# Patient Record
Sex: MALE | Race: WHITE | Employment: OTHER | URBAN - METROPOLITAN AREA
[De-identification: names, ages, dates, MRNs, and addresses within clinical notes are randomized per-mention and may not be internally consistent; named-entity substitution may affect disease eponyms.]

---

## 2018-09-25 ENCOUNTER — OFFICE VISIT (OUTPATIENT)
Dept: CARDIOLOGY CLINIC | Age: 63
End: 2018-09-25
Payer: COMMERCIAL

## 2018-09-25 VITALS
DIASTOLIC BLOOD PRESSURE: 58 MMHG | HEART RATE: 60 BPM | HEIGHT: 71 IN | WEIGHT: 172 LBS | SYSTOLIC BLOOD PRESSURE: 130 MMHG | BODY MASS INDEX: 24.08 KG/M2

## 2018-09-25 DIAGNOSIS — E78.00 PURE HYPERCHOLESTEROLEMIA: ICD-10-CM

## 2018-09-25 DIAGNOSIS — I25.10 CORONARY ARTERY DISEASE INVOLVING NATIVE CORONARY ARTERY OF NATIVE HEART WITHOUT ANGINA PECTORIS: Primary | ICD-10-CM

## 2018-09-25 DIAGNOSIS — I10 ESSENTIAL HYPERTENSION: ICD-10-CM

## 2018-09-25 DIAGNOSIS — I65.23 BILATERAL CAROTID ARTERY STENOSIS: ICD-10-CM

## 2018-09-25 PROCEDURE — G8420 CALC BMI NORM PARAMETERS: HCPCS | Performed by: INTERNAL MEDICINE

## 2018-09-25 PROCEDURE — 99214 OFFICE O/P EST MOD 30 MIN: CPT | Performed by: INTERNAL MEDICINE

## 2018-09-25 PROCEDURE — G8598 ASA/ANTIPLAT THER USED: HCPCS | Performed by: INTERNAL MEDICINE

## 2018-09-25 PROCEDURE — G8427 DOCREV CUR MEDS BY ELIG CLIN: HCPCS | Performed by: INTERNAL MEDICINE

## 2018-09-25 PROCEDURE — 1036F TOBACCO NON-USER: CPT | Performed by: INTERNAL MEDICINE

## 2018-09-25 PROCEDURE — 93000 ELECTROCARDIOGRAM COMPLETE: CPT | Performed by: INTERNAL MEDICINE

## 2018-09-25 PROCEDURE — 3017F COLORECTAL CA SCREEN DOC REV: CPT | Performed by: INTERNAL MEDICINE

## 2018-09-25 NOTE — PROGRESS NOTES
History:  Social History     Social History    Marital status:      Spouse name: N/A    Number of children: N/A    Years of education: N/A     Occupational History    Not on file. Social History Main Topics    Smoking status: Former Smoker     Packs/day: 1.00     Years: 30.00     Types: Cigarettes     Quit date: 1/5/2015    Smokeless tobacco: Former User     Types: Chew      Comment: smoking 1 cigarette/week since 7/2009; used chew as a teen    Alcohol use 0.0 oz/week      Comment: 1-2 drinks/week;  drinks 1-2 cups of coffee daily    Drug use: No      Comment: smoked marijuana in his 19's; none since    Sexual activity: Yes     Partners: Female     Other Topics Concern    Not on file     Social History Narrative    No narrative on file       Allergies: Allergies   Allergen Reactions    Bee Venom      Swelling, rash    Metoprolol      nightmares       Current Medications:  Current Outpatient Prescriptions   Medication Sig Dispense Refill    atorvastatin (LIPITOR) 40 MG tablet Take 1 tablet by mouth daily 90 tablet 3    aspirin 81 MG tablet Take 81 mg by mouth daily      amLODIPine (NORVASC) 10 MG tablet Take 10 mg by mouth daily      Omega-3 Fatty Acids (FISH OIL) 1200 MG CAPS Take by mouth 2 times daily      lisinopril (PRINIVIL;ZESTRIL) 5 MG tablet Take 1 tablet by mouth daily 30 tablet 3    HYDROcodone-acetaminophen (NORCO)  MG per tablet   Take 1 tablet by mouth 5 times daily       morphine sulfate ER (MS CONTIN) 15 MG CR tablet   Take 15 mg by mouth 3 times daily       Cholecalciferol (VITAMIN D3) 1000 UNITS CAPS Take 2,000 Units by mouth daily       Multiple Vitamin (THERA) TABS   Take by mouth daily        No current facility-administered medications for this visit.           Physical Exam:  BP (!) 130/58   Pulse 60   Ht 5' 11\" (1.803 m)   Wt 172 lb (78 kg)   BMI 23.99 kg/m²   Wt Readings from Last 3 Encounters:   09/25/18 172 lb (78 kg)   10/06/17 182 lb (82.6 kg) 09/25/17 182 lb 9.6 oz (82.8 kg)     The patient is awake, alert and in no discomfort or distress. No gross musculoskeletal deformity is present. No significant skin or nail changes are present. Gross examination of head, eyes, nose and throat are negative. Jugular venous pressure is normal and bilateral carotid bruits are present. Normal respiratory effort is noted with no accessory muscle usage present. Lung fields are clear to ascultation. Cardiac examination is notable for a regular rate and rhythm with no palpable thrill. No gallop rhythm or cardiac murmur are identified. A benign abdominal examination is present with no masses or organomegaly. Intact pulses are present throughout all extremities and no peripheral edema is present. No focal neurologic deficits are present. Laboratory Tests:  Lab Results   Component Value Date    CREATININE 1.0 08/23/2012    BUN 15 08/23/2012     08/23/2012    K 4.0 08/23/2012     08/23/2012    CO2 33 (H) 08/23/2012     No results found for: BNP  Lab Results   Component Value Date    WBC 8.0 08/23/2012    RBC 3.92 08/23/2012    HGB 14.3 08/23/2012    HCT 41.6 08/23/2012    .2 08/23/2012    MCH 36.6 08/23/2012    MCHC 34.4 08/23/2012    RDW 13.6 08/23/2012     08/23/2012    MPV 6.9 08/23/2012     No results for input(s): ALKPHOS, ALT, AST, PROT, BILITOT, BILIDIR, LABALBU in the last 72 hours.   Lab Results   Component Value Date    MG 2.4 08/23/2012     Lab Results   Component Value Date    PROTIME 11.1 08/22/2012    INR 1.1 08/22/2012     Lab Results   Component Value Date    TSH 0.564 08/23/2012     No components found for: CHLPL  Lab Results   Component Value Date    TRIG 214 (H) 08/23/2012    TRIG 213 (H) 07/19/2011     Lab Results   Component Value Date    HDL 28.0 (A) 08/23/2012    HDL 26.0 (A) 07/19/2011     Lab Results   Component Value Date    LDLCALC 144 (H) 08/23/2012    LDLCALC 63 07/19/2011       Cardiac Tests:  ECG: A resting electrocardiogram demonstrates evidence of sinus rhythm with left anterior fascicular block and right bundle branch block conduction patterns      ASSESSMENT / PLAN:  On a clinical basis, the patient appears stable from a cardiovascular standpoint with no interim symptoms in the face of his coronary atherosclerosis nor focal neurologic symptomatology in the face of his moderate bilateral carotid stenosis. At present, I have not altered his existing medical regimen and have recommended continued aggressive risk factor modification of blood pressure and serum lipids and attempt to reduce risk of future atherosclerotic development. I presently plan his clinical reassessment in approximately one year and would happily reassess him in the interim should additional cardiovascular difficulties or concerns arise. The patient's current medication list, allergies, problem list and results of all previously ordered testing were reviewed at today's visit. Follow-up office visit in 1 year    Eriberto Asencio [facility], [address] [facility]    An electronic copy of this follow-up progress note was forwarded to Dr. Kassandra Wood

## 2019-02-08 ENCOUNTER — HOSPITAL ENCOUNTER (OUTPATIENT)
Age: 64
Discharge: HOME | End: 2019-02-08
Payer: COMMERCIAL

## 2019-02-08 LAB
AMPHETAMINE SCREEN, URINE: NOT DETECTED
BARBITURATE SCREEN URINE: NOT DETECTED
BENZODIAZEPINE SCREEN, URINE: NOT DETECTED
CANNABINOID SCREEN URINE: NOT DETECTED
COCAINE METABOLITE SCREEN URINE: NOT DETECTED
METHADONE SCREEN, URINE: NOT DETECTED
OPIATE SCREEN URINE: POSITIVE
PHENCYCLIDINE SCREEN URINE: NOT DETECTED
PROPOXYPHENE SCREEN: NOT DETECTED

## 2019-02-08 PROCEDURE — 80307 DRUG TEST PRSMV CHEM ANLYZR: CPT

## 2019-02-08 PROCEDURE — G0480 DRUG TEST DEF 1-7 CLASSES: HCPCS

## 2019-02-14 LAB
6AM URINE: <10 NG/ML
CODEINE, URINE: <20 NG/ML
HYDROCODONE, URINE: 3451 NG/ML
HYDROMORPHONE, URINE: 33 NG/ML
MORPHINE URINE: 329 NG/ML
NORHYDROCODONE, URINE: 2095 NG/ML
NOROXYCODONE, URINE: <20 NG/ML
NOROXYMORPHONE, URINE: <20 NG/ML
OXYCODONE, URINE CONFIRMATION: <20 NG/ML
OXYMORPHONE, URINE: <20 NG/ML

## 2019-03-05 ENCOUNTER — HOSPITAL ENCOUNTER (OUTPATIENT)
Dept: CARDIOLOGY | Age: 64
Discharge: HOME | End: 2019-03-05
Admitting: SURGERY
Payer: COMMERCIAL

## 2019-03-05 ENCOUNTER — OFFICE VISIT (OUTPATIENT)
Dept: VASCULAR SURGERY | Age: 64
End: 2019-03-05
Payer: COMMERCIAL

## 2019-03-05 DIAGNOSIS — I65.23 BILATERAL CAROTID ARTERY STENOSIS: ICD-10-CM

## 2019-03-05 DIAGNOSIS — I65.21 CAROTID STENOSIS, ASYMPTOMATIC, RIGHT: Primary | ICD-10-CM

## 2019-03-05 PROCEDURE — 3017F COLORECTAL CA SCREEN DOC REV: CPT | Performed by: SURGERY

## 2019-03-05 PROCEDURE — G8598 ASA/ANTIPLAT THER USED: HCPCS | Performed by: SURGERY

## 2019-03-05 PROCEDURE — G8420 CALC BMI NORM PARAMETERS: HCPCS | Performed by: SURGERY

## 2019-03-05 PROCEDURE — G8427 DOCREV CUR MEDS BY ELIG CLIN: HCPCS | Performed by: SURGERY

## 2019-03-05 PROCEDURE — 99214 OFFICE O/P EST MOD 30 MIN: CPT | Performed by: SURGERY

## 2019-03-05 PROCEDURE — 1036F TOBACCO NON-USER: CPT | Performed by: SURGERY

## 2019-03-05 PROCEDURE — 93880 EXTRACRANIAL BILAT STUDY: CPT

## 2019-03-05 PROCEDURE — G8484 FLU IMMUNIZE NO ADMIN: HCPCS | Performed by: SURGERY

## 2019-09-24 ENCOUNTER — TELEPHONE (OUTPATIENT)
Dept: CARDIOLOGY CLINIC | Age: 64
End: 2019-09-24

## 2019-10-10 ENCOUNTER — OFFICE VISIT (OUTPATIENT)
Dept: CARDIOLOGY CLINIC | Age: 64
End: 2019-10-10
Payer: COMMERCIAL

## 2019-10-10 VITALS
WEIGHT: 160 LBS | BODY MASS INDEX: 22.4 KG/M2 | HEIGHT: 71 IN | SYSTOLIC BLOOD PRESSURE: 132 MMHG | HEART RATE: 86 BPM | DIASTOLIC BLOOD PRESSURE: 70 MMHG

## 2019-10-10 DIAGNOSIS — I65.23 BILATERAL CAROTID ARTERY STENOSIS: ICD-10-CM

## 2019-10-10 DIAGNOSIS — E78.00 PURE HYPERCHOLESTEROLEMIA: ICD-10-CM

## 2019-10-10 DIAGNOSIS — I10 ESSENTIAL HYPERTENSION: ICD-10-CM

## 2019-10-10 DIAGNOSIS — J44.9 CHRONIC OBSTRUCTIVE PULMONARY DISEASE, UNSPECIFIED COPD TYPE (HCC): ICD-10-CM

## 2019-10-10 DIAGNOSIS — I25.10 CORONARY ARTERY DISEASE INVOLVING NATIVE CORONARY ARTERY OF NATIVE HEART WITHOUT ANGINA PECTORIS: Primary | ICD-10-CM

## 2019-10-10 PROCEDURE — G8484 FLU IMMUNIZE NO ADMIN: HCPCS | Performed by: INTERNAL MEDICINE

## 2019-10-10 PROCEDURE — 1036F TOBACCO NON-USER: CPT | Performed by: INTERNAL MEDICINE

## 2019-10-10 PROCEDURE — G8420 CALC BMI NORM PARAMETERS: HCPCS | Performed by: INTERNAL MEDICINE

## 2019-10-10 PROCEDURE — G8598 ASA/ANTIPLAT THER USED: HCPCS | Performed by: INTERNAL MEDICINE

## 2019-10-10 PROCEDURE — 93000 ELECTROCARDIOGRAM COMPLETE: CPT | Performed by: INTERNAL MEDICINE

## 2019-10-10 PROCEDURE — G8926 SPIRO NO PERF OR DOC: HCPCS | Performed by: INTERNAL MEDICINE

## 2019-10-10 PROCEDURE — 99214 OFFICE O/P EST MOD 30 MIN: CPT | Performed by: INTERNAL MEDICINE

## 2019-10-10 PROCEDURE — 3017F COLORECTAL CA SCREEN DOC REV: CPT | Performed by: INTERNAL MEDICINE

## 2019-10-10 PROCEDURE — G8427 DOCREV CUR MEDS BY ELIG CLIN: HCPCS | Performed by: INTERNAL MEDICINE

## 2019-10-10 PROCEDURE — 3023F SPIROM DOC REV: CPT | Performed by: INTERNAL MEDICINE

## 2020-10-14 ENCOUNTER — OFFICE VISIT (OUTPATIENT)
Dept: CARDIOLOGY CLINIC | Age: 65
End: 2020-10-14
Payer: COMMERCIAL

## 2020-10-14 VITALS
DIASTOLIC BLOOD PRESSURE: 88 MMHG | BODY MASS INDEX: 22.68 KG/M2 | SYSTOLIC BLOOD PRESSURE: 156 MMHG | HEIGHT: 71 IN | WEIGHT: 162 LBS | HEART RATE: 76 BPM

## 2020-10-14 PROCEDURE — 3023F SPIROM DOC REV: CPT | Performed by: INTERNAL MEDICINE

## 2020-10-14 PROCEDURE — 3017F COLORECTAL CA SCREEN DOC REV: CPT | Performed by: INTERNAL MEDICINE

## 2020-10-14 PROCEDURE — G8420 CALC BMI NORM PARAMETERS: HCPCS | Performed by: INTERNAL MEDICINE

## 2020-10-14 PROCEDURE — G8926 SPIRO NO PERF OR DOC: HCPCS | Performed by: INTERNAL MEDICINE

## 2020-10-14 PROCEDURE — 99214 OFFICE O/P EST MOD 30 MIN: CPT | Performed by: INTERNAL MEDICINE

## 2020-10-14 PROCEDURE — G8427 DOCREV CUR MEDS BY ELIG CLIN: HCPCS | Performed by: INTERNAL MEDICINE

## 2020-10-14 PROCEDURE — 93000 ELECTROCARDIOGRAM COMPLETE: CPT | Performed by: INTERNAL MEDICINE

## 2020-10-14 PROCEDURE — 4040F PNEUMOC VAC/ADMIN/RCVD: CPT | Performed by: INTERNAL MEDICINE

## 2020-10-14 PROCEDURE — 1123F ACP DISCUSS/DSCN MKR DOCD: CPT | Performed by: INTERNAL MEDICINE

## 2020-10-14 PROCEDURE — 1036F TOBACCO NON-USER: CPT | Performed by: INTERNAL MEDICINE

## 2020-10-14 PROCEDURE — G8484 FLU IMMUNIZE NO ADMIN: HCPCS | Performed by: INTERNAL MEDICINE

## 2020-10-14 RX ORDER — LISINOPRIL 5 MG/1
5 TABLET ORAL 2 TIMES DAILY
Qty: 60 TABLET | Refills: 3 | Status: SHIPPED
Start: 2020-10-14 | End: 2020-10-15 | Stop reason: SDUPTHER

## 2020-10-14 NOTE — PROGRESS NOTES
OUTPATIENT CARDIOLOGY FOLLOW-UP    Name: Jimenez Israel    Age: 72 y.o. Primary Care Physician: Marcela Sarabia MD    Date of Service: 10/14/2020    Chief Complaint: Coronary atherosclerosis, hypertension, carotid stenosis, chronic obstructive lung disease    Interim History: Since his most recent evaluation, the patient has remained stable from a cardiovascular standpoint with no interim symptoms of anginal-like chest discomfort or other ischemic equivalents, decompensated left ventricular systolic dysfunction or volume overload. He denies any palpitations or other arrhythmia related symptoms. No symptoms of a focal neurologic origin but noted in the face of his carotid stenosis. At the time of evaluation in the absence of significant changes of his weight evidence of predominantly stage I hypertension is noted with his report of elevated systolic blood pressures on routine blood pressure monitoring. Review of Systems: The remainder of a complete multisystem review including consitutional, central nervous, respiratory, circulatory, gastrointestinal, genitourinary, endocrinologic, hematologic, musculoskeletal and psychiatric are negative. Past Medical History:  Past Medical History:   Diagnosis Date    Arthritis     CAD (coronary artery disease)     Carotid stenosis     Chronic back pain     Chronic obstructive pulmonary disease (COPD) ([facility] [address] 10/10/2019    Hyperlipidemia     Hypertension     Restless legs syndrome     Right bundle branch block 6/2011    New RBBB on EKG. Past Surgical History:  Past Surgical History:   Procedure Laterality Date    CARDIAC SURGERY      COLONOSCOPY      CORONARY ARTERY [facility]PASS GRAFT  [facility]/27/2009    [facility]. CABG [facility].    DENTAL SURGERY  6/16/2011    Full dental extraction.  DIAGNOSTIC CARDIAC CATH LAB PROCEDURE  7/2009    [facility].     ECHO COMPL W DOP COLOR FLOW  8/23/2012         ELBOW SURGERY      lt    KNEE SURGERY  1970    rt    SHOULDER SURGERY bilateral    TONSILLECTOMY      VASECTOMY         Family History:  History reviewed. No pertinent family history. Social History:  Social History     Socioeconomic History    Marital status:      Spouse name: Not on file    Number of children: Not on file    Years of education: Not on file    Highest education level: Not on file   Occupational History    Not on file   Social Needs    Financial resource strain: Not on file    Food insecurity     Worry: Not on file     Inability: Not on file    Transportation needs     Medical: Not on file     Non-medical: Not on file   Tobacco Use    Smoking status: Former Smoker     Packs/day: 1.00     Years: 30.00     Pack years: 30.00     Types: Cigarettes     Last attempt to quit: 2015     Years since quittin.7    Smokeless tobacco: Former User     Types: Chew    Tobacco comment: smoking 1 cigarette/week since 2009; used chew as a teen   Substance and Sexual Activity    Alcohol use: Not Currently     Alcohol/week: 0.0 standard drinks     Comment:    drinks 1-2 cups of coffee daily    Drug use: No     Comment: smoked marijuana in his 19's; none since    Sexual activity: Yes     Partners: Female   Lifestyle    Physical activity     Days per week: Not on file     Minutes per session: Not on file    Stress: Not on file   Relationships    Social connections     Talks on phone: Not on file     Gets together: Not on file     Attends [employer] service: Not on file     Active member of club or organization: Not on file     Attends meetings of clubs or organizations: Not on file     Relationship status: Not on file    Intimate partner violence     Fear of current or ex partner: Not on file     Emotionally abused: Not on file     Physically abused: Not on file     Forced sexual activity: Not on file   Other Topics Concern    Not on file   Social History Narrative    Not on file       Allergies:   Allergies   Allergen Reactions    Bee Venom Swelling, rash    Metoprolol      nightmares       Current Medications:  Current Outpatient Medications   Medication Sig Dispense Refill    metoprolol tartrate (LOPRESSOR) 25 MG tablet Take 25 mg by mouth as needed      lisinopril (PRINIVIL;ZESTRIL) 5 MG tablet Take 1 tablet by mouth 2 times daily 60 tablet 3    atorvastatin (LIPITOR) 40 MG tablet Take 1 tablet by mouth daily 90 tablet 3    aspirin 81 MG tablet Take 81 mg by mouth daily      amLODIPine (NORVASC) 10 MG tablet Take 10 mg by mouth daily      Omega-3 Fatty Acids (FISH OIL) 1200 MG CAPS Take by mouth 2 times daily      HYDROcodone-acetaminophen (NORCO)  MG per tablet Take 1 tablet by mouth every 6 hours as needed.  morphine sulfate ER (MS CONTIN) 15 MG CR tablet   Take 15 mg by mouth 3 times daily       Multiple Vitamin (THERA) TABS   Take by mouth daily        No current facility-administered medications for this visit. Physical Exam:  BP (!) 156/88 (Site: Right Upper Arm, Position: Sitting, Cuff Size: Medium Adult)   Pulse 76   Ht 5' 11\" (1.803 m)   Wt 162 lb (73.5 kg)   BMI 22.59 kg/m²   Wt Readings from Last 3 Encounters:   10/14/20 162 lb (73.5 kg)   10/10/19 160 lb (72.6 kg)   09/25/18 172 lb (78 kg)     The patient is awake, alert and in no discomfort or distress. No gross musculoskeletal deformity is present. No significant skin or nail changes are present. Gross examination of head, eyes, nose and throat are negative. Jugular venous pressure is normal and no carotid bruits are present. Normal respiratory effort is noted with no accessory muscle usage present. Lung fields are clear to ascultation. Cardiac examination is notable for a regular rate and rhythm with no palpable thrill. No gallop rhythm or cardiac murmur are identified. A benign abdominal examination is present with no masses or organomegaly. Intact pulses are present throughout all extremities and no peripheral edema is present.  No focal neurologic deficits are present. Laboratory Tests:  Lab Results   Component Value Date    CREATININE 1.0 08/23/2012    BUN 15 08/23/2012     08/23/2012    K 4.0 08/23/2012     08/23/2012    CO2 33 (H) 08/23/2012     No results found for: BNP  Lab Results   Component Value Date    WBC 8.0 08/23/2012    RBC 3.92 08/23/2012    HGB 14.3 08/23/2012    HCT 41.6 08/23/2012    .2 08/23/2012    MCH 36.6 08/23/2012    MCHC 34.4 08/23/2012    RDW 13.6 08/23/2012     08/23/2012    MPV 6.9 08/23/2012     No results for input(s): ALKPHOS, ALT, AST, PROT, BILITOT, BILIDIR, LABALBU in the last 72 hours. Lab Results   Component Value Date    MG 2.4 08/23/2012     Lab Results   Component Value Date    PROTIME 11.1 08/22/2012    INR 1.1 08/22/2012     Lab Results   Component Value Date    TSH 0.564 08/23/2012     No components found for: CHLPL  Lab Results   Component Value Date    TRIG 214 (H) 08/23/2012    TRIG 213 (H) 07/19/2011     Lab Results   Component Value Date    HDL 28.0 (A) 08/23/2012    HDL 26.0 (A) 07/19/2011     Lab Results   Component Value Date    LDLCALC 144 (H) 08/23/2012    [address] 07/19/2011       Cardiac Tests:  ECG: A resting electrocardiogram demonstrates evidence of sinus rhythm with occasional ventricular ectopy with evidence of left anterior fascicular block and right bundle branch block conduction patterns      ASSESSMENT / PLAN: Clinical basis, the patient remains symptomatically stable from a cardiovascular standpoint in the face of his coronary atherosclerosis and hypertension with suboptimal control of his blood pressure reported both on home blood pressure monitoring time of his assessment. On this basis, I have increased his lisinopril dosage to 5 mg twice daily with recommended monitoring of his blood pressure with his routine basis prior to his reassessment with you next month and further modification of his dosing as necessary.   I have encouraged continued appropriate lifestyle monitoring including that of sodium intake to further assist blood pressure control. I would additionally recommend reassessment of his serum chemistries to assess renal function and electrolytes with the alteration of his angiotensin-converting enzyme inhibitor dosing. Continue aggressive risk factor modification of blood pressure and serum lipids will remain essential to reducing risk of future atherosclerotic development. I presently plan his clinical reassessment in approximately 1 year with consideration of a repeat functional assessment of his coronary circulation and would happily reevaluate him in the interim should additional cardiovascular difficulties or concerns arise. The patient's current medication list, allergies, problem list and results of all previously ordered testing were reviewed at today's visit. Follow-up office visit in 1 year      Note: This report was completed using computerized voice recognition software. Every effort has been made to ensure accuracy, however; and invert and computerized transcription errors may be present. Anabel Decker.  Wilson Aldrich, [facility]

## 2020-10-15 RX ORDER — LISINOPRIL 5 MG/1
5 TABLET ORAL 2 TIMES DAILY
Qty: 60 TABLET | Refills: 3 | Status: SHIPPED | OUTPATIENT
Start: 2020-10-15

## 2021-06-30 ENCOUNTER — HOSPITAL ENCOUNTER (OUTPATIENT)
Age: 66
Discharge: HOME | End: 2021-07-02

## 2021-06-30 PROCEDURE — 88305 TISSUE EXAM BY PATHOLOGIST: CPT

## 2021-11-15 ENCOUNTER — OFFICE VISIT (OUTPATIENT)
Dept: CARDIOLOGY CLINIC | Age: 66
End: 2021-11-15
Payer: COMMERCIAL

## 2021-11-15 VITALS
HEART RATE: 70 BPM | SYSTOLIC BLOOD PRESSURE: 178 MMHG | BODY MASS INDEX: 24.22 KG/M2 | WEIGHT: 173 LBS | DIASTOLIC BLOOD PRESSURE: 80 MMHG | HEIGHT: 71 IN

## 2021-11-15 DIAGNOSIS — E78.00 PURE HYPERCHOLESTEROLEMIA: ICD-10-CM

## 2021-11-15 DIAGNOSIS — I25.10 CORONARY ARTERY DISEASE INVOLVING NATIVE CORONARY ARTERY OF NATIVE HEART WITHOUT ANGINA PECTORIS: Primary | ICD-10-CM

## 2021-11-15 DIAGNOSIS — I10 PRIMARY HYPERTENSION: ICD-10-CM

## 2021-11-15 DIAGNOSIS — J44.9 CHRONIC OBSTRUCTIVE PULMONARY DISEASE, UNSPECIFIED COPD TYPE (HCC): ICD-10-CM

## 2021-11-15 DIAGNOSIS — I65.23 BILATERAL CAROTID ARTERY STENOSIS: ICD-10-CM

## 2021-11-15 PROCEDURE — G8427 DOCREV CUR MEDS BY ELIG CLIN: HCPCS | Performed by: INTERNAL MEDICINE

## 2021-11-15 PROCEDURE — 99214 OFFICE O/P EST MOD 30 MIN: CPT | Performed by: INTERNAL MEDICINE

## 2021-11-15 PROCEDURE — 4040F PNEUMOC VAC/ADMIN/RCVD: CPT | Performed by: INTERNAL MEDICINE

## 2021-11-15 PROCEDURE — G8926 SPIRO NO PERF OR DOC: HCPCS | Performed by: INTERNAL MEDICINE

## 2021-11-15 PROCEDURE — 1123F ACP DISCUSS/DSCN MKR DOCD: CPT | Performed by: INTERNAL MEDICINE

## 2021-11-15 PROCEDURE — G8484 FLU IMMUNIZE NO ADMIN: HCPCS | Performed by: INTERNAL MEDICINE

## 2021-11-15 PROCEDURE — 3023F SPIROM DOC REV: CPT | Performed by: INTERNAL MEDICINE

## 2021-11-15 PROCEDURE — G8420 CALC BMI NORM PARAMETERS: HCPCS | Performed by: INTERNAL MEDICINE

## 2021-11-15 PROCEDURE — 3017F COLORECTAL CA SCREEN DOC REV: CPT | Performed by: INTERNAL MEDICINE

## 2021-11-15 PROCEDURE — 93000 ELECTROCARDIOGRAM COMPLETE: CPT | Performed by: INTERNAL MEDICINE

## 2021-11-15 PROCEDURE — 1036F TOBACCO NON-USER: CPT | Performed by: INTERNAL MEDICINE

## 2021-11-15 NOTE — PROGRESS NOTES
OUTPATIENT CARDIOLOGY FOLLOW-UP    Name: Timothy Cervantes    Age: 77 y.o. Primary Care Physician: Tabitha Roper MD    Date of Service: 11/15/2021    Chief Complaint: Coronary atherosclerosis, chronic obstructive lung disease, hypertension, bilateral carotid stenosis    Interim History: Since his most recent evaluation, the patient remains compensated from a cardiovascular standpoint and denies interim symptoms of anginal-like chest discomfort or other ischemic equivalents no additional manifestations of decompensated left ventricular systolic dysfunction or volume overload. He has experienced no symptoms of a focal neurologic origin. At the time of interim assessment by the vascular surgical service at the [facility], repeat carotid ultrasound studies demonstrate moderate stenosis of both internal carotid distributions with suspicion of a left subclavian stenosis based on flow within his left vertebral artery in the absence of symptoms or pulse deficits. On the day of his present assessment evidence of stage II systolic hypertension is noted. Review of Systems: The remainder of a complete multisystem review including consitutional, central nervous, respiratory, circulatory, gastrointestinal, genitourinary, endocrinologic, hematologic, musculoskeletal and psychiatric are negative. Past Medical History:  Past Medical History:   Diagnosis Date    Arthritis     CAD (coronary artery disease)     Carotid stenosis     Chronic back pain     Chronic obstructive pulmonary disease (COPD) ([facility] [address] 10/10/2019    Hyperlipidemia     Hypertension     Restless legs syndrome     Right bundle branch block 6/2011    New RBBB on EKG. Past Surgical History:  Past Surgical History:   Procedure Laterality Date    CARDIAC SURGERY      COLONOSCOPY      CORONARY ARTERY BYPASS GRAFT  7/27/2009    [facility]. CABG [facility].    DENTAL SURGERY  6/16/2011    Full dental extraction.       DIAGNOSTIC CARDIAC CATH LAB PROCEDURE  2009    [facility].  ECHO COMPL W DOP COLOR FLOW  2012         ELBOW SURGERY      lt    KNEE SURGERY  1970    rt    SHOULDER SURGERY      bilateral    TONSILLECTOMY      VASECTOMY         Family History:  History reviewed. No pertinent family history. Social History:  Social History     Socioeconomic History    Marital status:      Spouse name: Not on file    Number of children: Not on file    Years of education: Not on file    Highest education level: Not on file   Occupational History    Not on file   Tobacco Use    Smoking status: Former Smoker     Packs/day: 1.00     Years: 30.00     Pack years: 30.00     Types: Cigarettes     Quit date: 2015     Years since quittin.8    Smokeless tobacco: Former User     Types: Chew    Tobacco comment: smoking 1 cigarette/week since 2009; used chew as a teen   Vaping Use    Vaping Use: Not on file   Substance and Sexual Activity    Alcohol use: Not Currently     Alcohol/week: 0.0 standard drinks     Comment:    drinks 1-2 cups of coffee daily    Drug use: No     Comment: smoked marijuana in his 19's; none since    Sexual activity: Yes     Partners: Female   Other Topics Concern    Not on file   Social History Narrative    Not on file     Social Determinants of Health     Financial Resource Strain:     Difficulty of Paying Living Expenses: Not on file   Food Insecurity:     Worried About [address] [facility] in the Last Year: Not on file    [address] in the Last Year: Not on file   Transportation Needs:     Lack of Transportation (Medical): Not on file    Lack of Transportation (Non-Medical):  Not on file   Physical Activity:     Days of Exercise per Week: Not on file    Minutes of Exercise per Session: Not on file   Stress:     Feeling of Stress : Not on file   Social Connections:     Frequency of Communication with Friends and Family: Not on file    Frequency of Social Gatherings with Friends and Family: Not on file    Attends Sabianism Services: Not on file    Active Member of Clubs or Organizations: Not on file    Attends Club or Organization Meetings: Not on file    Marital Status: Not on file   Intimate Partner Violence:     Fear of Current or Ex-Partner: Not on file    Emotionally Abused: Not on file    Physically Abused: Not on file    Sexually Abused: Not on file   Housing Stability:     Unable to Pay for Housing in the Last Year: Not on file    Number of [place] in the Last Year: Not on file    Unstable Housing in the Last Year: Not on file       Allergies: Allergies   Allergen Reactions    Bee Venom      Swelling, rash    Metoprolol      nightmares       Current Medications:  Current Outpatient Medications   Medication Sig Dispense Refill    lisinopril (PRINIVIL;ZESTRIL) 5 MG tablet Take 1 tablet by mouth 2 times daily 60 tablet 3    atorvastatin (LIPITOR) 40 MG tablet Take 1 tablet by mouth daily 90 tablet 3    aspirin 81 MG tablet Take 81 mg by mouth daily      amLODIPine (NORVASC) 10 MG tablet Take 10 mg by mouth daily      Omega-3 Fatty Acids (FISH OIL) 1200 MG CAPS Take by mouth 2 times daily      HYDROcodone-acetaminophen (NORCO)  MG per tablet Take 1 tablet by mouth every 6 hours as needed.  morphine sulfate ER (MS CONTIN) 15 MG CR tablet   Take 15 mg by mouth 3 times daily       Multiple Vitamin (THERA) TABS   Take by mouth daily        No current facility-administered medications for this visit. Physical Exam:  BP (!) 178/80 (Site: Right Upper Arm, Position: Sitting, Cuff Size: Large Adult)   Pulse 70   Ht 5' 11\" (1.803 m)   Wt 173 lb (78.5 kg)   BMI 24.13 kg/m²   Wt Readings from Last 3 Encounters:   11/15/21 173 lb (78.5 kg)   10/14/20 162 lb (73.5 kg)   10/10/19 160 lb (72.6 kg)     The patient is awake, alert and in no discomfort or distress. No gross musculoskeletal deformity is present.  No significant skin or nail changes are present. Gross examination of head, eyes, nose and throat are negative. Jugular venous pressure is normal and bilateral carotid bruits are present. Normal respiratory effort is noted with no accessory muscle usage present. Lung fields are clear to ascultation. Cardiac examination is notable for a regular rate and rhythm with no palpable thrill. No gallop rhythm or cardiac murmur are identified. A benign abdominal examination is present with no masses or organomegaly. Intact pulses are present throughout all extremities and no peripheral edema is present. No focal neurologic deficits are present. Laboratory Tests:  Lab Results   Component Value Date    CREATININE 1.0 08/23/2012    BUN 15 08/23/2012     08/23/2012    K 4.0 08/23/2012     08/23/2012    CO2 33 (H) 08/23/2012     No results found for: BNP  Lab Results   Component Value Date    WBC 8.0 08/23/2012    RBC 3.92 08/23/2012    HGB 14.3 08/23/2012    HCT 41.6 08/23/2012    .2 08/23/2012    MCH 36.6 08/23/2012    MCHC 34.4 08/23/2012    RDW 13.6 08/23/2012     08/23/2012    MPV 6.9 08/23/2012     No results for input(s): ALKPHOS, ALT, AST, PROT, BILITOT, BILIDIR, LABALBU in the last 72 hours.   Lab Results   Component Value Date    MG 2.4 08/23/2012     Lab Results   Component Value Date    PROTIME 11.1 08/22/2012    INR 1.1 08/22/2012     Lab Results   Component Value Date    TSH 0.564 08/23/2012     No components found for: CHLPL  Lab Results   Component Value Date    TRIG 214 (H) 08/23/2012    TRIG 213 (H) 07/19/2011     Lab Results   Component Value Date    HDL 28.0 (A) 08/23/2012    HDL 26.0 (A) 07/19/2011     Lab Results   Component Value Date    LDLCALC 144 (H) 08/23/2012    LDLCALC 63 07/19/2011       Cardiac Tests:  ECG: A resting electrocardiogram demonstrates evidence of sinus rhythm with left anterior fascicular block and right bundle branch block conduction patterns      ASSESSMENT / PLAN: On a clinical basis, the patient presently appears compensated from a cardiovascular standpoint with no active cardiovascular symptoms in the face of his diffuse atherosclerotic vascular disease. Presently, I have not altered his existing medical regimen and on a prognostic basis of recommend the performance of a gated vasodilator myocardial perfusion imaging study will provide additional recommendations as appropriate following its completion and review. Ongoing needs of aggressive risk factor modification of blood pressure and serum lipids will remain essential to reducing risk of future atherosclerotic development. Unless dictated by perfusion findings, I plan to continue annual cardiovascular reassessment would happily reevaluate him in the interim should additional cardiovascular difficulties or concerns arise. The patient's current medication list, allergies, problem list and results of all previously ordered testing were reviewed at today's visit. Follow-up office visit in 1 year      Note: This report was completed using computerized voice recognition software. Every effort has been made to ensure accuracy, however; inadvertent computerized transcription errors may be present. Humaira Covarrubias.  Amol Garcia, [facility] [facility]

## 2021-11-30 ENCOUNTER — TELEPHONE (OUTPATIENT)
Dept: CARDIOLOGY | Age: 66
End: 2021-11-30

## 2021-11-30 NOTE — TELEPHONE ENCOUNTER
Spoke with patient reminder of appointment on 12/1/21 at 0830 for a Pharmacological stress test instructions and COVID checklist reviewed patient confirmed appointment

## 2021-12-01 ENCOUNTER — HOSPITAL ENCOUNTER (OUTPATIENT)
Dept: CARDIOLOGY | Age: 66
Discharge: HOME | End: 2021-12-01
Payer: COMMERCIAL

## 2021-12-01 ENCOUNTER — TELEPHONE (OUTPATIENT)
Dept: CARDIOLOGY CLINIC | Age: 66
End: 2021-12-01

## 2021-12-01 VITALS
SYSTOLIC BLOOD PRESSURE: 150 MMHG | DIASTOLIC BLOOD PRESSURE: 52 MMHG | BODY MASS INDEX: 24.22 KG/M2 | HEART RATE: 56 BPM | RESPIRATION RATE: 20 BRPM | HEIGHT: 71 IN | WEIGHT: 173 LBS

## 2021-12-01 DIAGNOSIS — I25.10 CORONARY ARTERY DISEASE INVOLVING NATIVE CORONARY ARTERY OF NATIVE HEART WITHOUT ANGINA PECTORIS: ICD-10-CM

## 2021-12-01 PROCEDURE — 2580000003 HC RX 258: Performed by: INTERNAL MEDICINE

## 2021-12-01 PROCEDURE — 6360000002 HC RX W HCPCS: Performed by: INTERNAL MEDICINE

## 2021-12-01 PROCEDURE — 78452 HT MUSCLE IMAGE SPECT MULT: CPT

## 2021-12-01 PROCEDURE — 3430000000 HC RX DIAGNOSTIC RADIOPHARMACEUTICAL: Performed by: INTERNAL MEDICINE

## 2021-12-01 PROCEDURE — A9502 TC99M TETROFOSMIN: HCPCS | Performed by: INTERNAL MEDICINE

## 2021-12-01 PROCEDURE — 93017 CV STRESS TEST TRACING ONLY: CPT

## 2021-12-01 RX ORDER — SODIUM CHLORIDE 0.9 % (FLUSH) 0.9 %
10 SYRINGE (ML) INJECTION PRN
Status: DISCONTINUED | OUTPATIENT
Start: 2021-12-01 | End: 2021-12-02 | Stop reason: HOSPADM

## 2021-12-01 RX ORDER — MECLIZINE HYDROCHLORIDE 25 MG/1
25 TABLET ORAL 3 TIMES DAILY PRN
COMMUNITY

## 2021-12-01 RX ADMIN — SODIUM CHLORIDE, PRESERVATIVE FREE 10 ML: 5 INJECTION INTRAVENOUS at 08:27

## 2021-12-01 RX ADMIN — TETROFOSMIN 11 MILLICURIE: 0.23 INJECTION, POWDER, LYOPHILIZED, FOR SOLUTION INTRAVENOUS at 08:27

## 2021-12-01 RX ADMIN — SODIUM CHLORIDE, PRESERVATIVE FREE 10 ML: 5 INJECTION INTRAVENOUS at 10:05

## 2021-12-01 RX ADMIN — TETROFOSMIN 33.6 MILLICURIE: 0.23 INJECTION, POWDER, LYOPHILIZED, FOR SOLUTION INTRAVENOUS at 10:05

## 2021-12-01 RX ADMIN — SODIUM CHLORIDE, PRESERVATIVE FREE 10 ML: 5 INJECTION INTRAVENOUS at 10:06

## 2021-12-01 RX ADMIN — REGADENOSON 0.4 MG: 0.08 INJECTION, SOLUTION INTRAVENOUS at 10:05

## 2021-12-01 NOTE — PROCEDURES
[address] [facility]. Natalie Alejandra [address] [place] [address]  [phone]                 Pharmacologic Stress Nuclear Gated SPECT Study    Name: Sanchez Rosario Account Number: [de-identified]    :  1955          Sex: male         Date of Study:  2021    Height: 5' 11\" (180.3 cm)         Weight: 173 lb (78.5 kg)     Ordering Provider: Alena Swanson          PCP: Mallory Proctor MD      Cardiologist: Alena Swanson             Interpreting Physician: Alena Swanson  _________________________________________________________________________________    Indication:   Evaluation of extent and severity of coronary artery disease    Clinical History:   Patient has prior history of coronary artery disease. Resting ECG:    VT int 158m sec, QRS int 154m sec, QT int 460m sec; HR 56 bpm  Normal sinus rhythm with left anterior fascicular block and right bundle branch block conduction patterns    Procedure:   Pharmacologic stress testing was performed with regadenoson 0.4 mg for 15 seconds. The heart rate was 56 at baseline and nora to 91 beats during the infusion. The blood pressure at baseline was 150/52 and blood pressure at the end of infusion was 157/47. Blood pressure response was normal during the stress procedure. The patient tolerated the infusion well. ECG during the infusion did not change. IMAGING: Myocardial perfusion imaging was performed at rest 30-35 minutes following the intravenous injection of 11 mCi of (Tc-tetrofosmin) followed by 10 ml of Normal Saline. As per infusion protocol, the patient was injected intravenously with 33.6 mCi of (Tc-tetrofosmin) followed by 10 ml of Normal Saline. Gated post-stress tomographic imaging was performed 45 minutes after stress. FINDINGS: The overall quality of the study was good.      Left ventricular cavity size was noted to be normal.    Rotational analog analysis demonstrated abnormal extracardiac radioactivity from hepatic uptake and adjacent to the apical segment. The gated SPECT stress imaging in the short, vertical long, and horizontal long axis demonstrated a small segment of moderately diminished tracer within the periapical segment both on post regadenoson and resting images. Gated SPECT left ventricular ejection fraction was calculated to be 67%, with with abnormal septal motion consistent with that of prior cardiac surgery. Impression:    1. Electrocardiographically normal regadenoson infusion with a clinically  non-ischemic response  2. Myocardial perfusion imaging was equivocal with a small size, moderate intensity fixed periapical perfusion abnormality in the face of significant extraneous hepatic uptake. 3. Overall left ventricular systolic function was normal with abnormal septal motion consistent with that of prior cardiac surgery. .  4.   Low risk pharmacologic stress test.    Thank you for sending your patient to this [employer].      Electronically signed by Luis M Israel MD on 12/1/21 at 12:55 PM EST [Slow urine stream] : slow urine stream [Negative] : Heme/Lymph [Abdominal Pain] : abdominal pain [see HPI] : see HPI [[email or web address]] : Pain on abdomen area and the right side of the back

## 2021-12-01 NOTE — TELEPHONE ENCOUNTER
----- Message from Pierce Councilman, MD sent at 12/1/2021 12:56 PM EST -----  Please notify patient that stress test shows no abnormalities consistent with that of progression of his disease and normal heart muscle function. Continue as directed and follow-up as scheduled    LM for pt re above.

## 2022-08-05 ENCOUNTER — APPOINTMENT (OUTPATIENT)
Dept: CT IMAGING | Age: 67
DRG: 200 | End: 2022-08-05
Payer: COMMERCIAL

## 2022-08-05 ENCOUNTER — HOSPITAL ENCOUNTER (INPATIENT)
Age: 67
LOS: 2 days | Discharge: HOME | DRG: 200 | End: 2022-08-07
Attending: STUDENT IN AN ORGANIZED HEALTH CARE EDUCATION/TRAINING PROGRAM | Admitting: SURGERY
Payer: COMMERCIAL

## 2022-08-05 ENCOUNTER — APPOINTMENT (OUTPATIENT)
Dept: GENERAL RADIOLOGY | Age: 67
DRG: 200 | End: 2022-08-05
Payer: COMMERCIAL

## 2022-08-05 DIAGNOSIS — S27.0XXA TRAUMATIC PNEUMOTHORAX, INITIAL ENCOUNTER: Primary | ICD-10-CM

## 2022-08-05 DIAGNOSIS — S22.42XA CLOSED FRACTURE OF MULTIPLE RIBS OF LEFT SIDE, INITIAL ENCOUNTER: ICD-10-CM

## 2022-08-05 DIAGNOSIS — T14.90XA TRAUMA: ICD-10-CM

## 2022-08-05 LAB
ALBUMIN SERPL-MCNC: 4.3 G/DL (ref 3.5–5.2)
ALP BLD-CCNC: 65 U/L (ref 40–129)
ALT SERPL-CCNC: 11 U/L (ref 0–40)
ANION GAP SERPL CALCULATED.3IONS-SCNC: 10 MMOL/L (ref 7–16)
APTT: 31.5 SEC (ref 24.5–35.1)
AST SERPL-CCNC: 16 U/L (ref 0–39)
BASOPHILS ABSOLUTE: 0.02 E9/L (ref 0–0.2)
BASOPHILS RELATIVE PERCENT: 0.2 % (ref 0–2)
BILIRUB SERPL-MCNC: 0.3 MG/DL (ref 0–1.2)
BUN BLDV-MCNC: 11 MG/DL (ref 6–23)
CALCIUM SERPL-MCNC: 8.9 MG/DL (ref 8.6–10.2)
CHLORIDE BLD-SCNC: 104 MMOL/L (ref 98–107)
CO2: 25 MMOL/L (ref 22–29)
CREAT SERPL-MCNC: 0.8 MG/DL (ref 0.7–1.2)
EOSINOPHILS ABSOLUTE: 0.35 E9/L (ref 0.05–0.5)
EOSINOPHILS RELATIVE PERCENT: 3.2 % (ref 0–6)
GFR AFRICAN AMERICAN: >60
GFR NON-AFRICAN AMERICAN: >60 ML/MIN/1.73
GLUCOSE BLD-MCNC: 99 MG/DL (ref 74–99)
HCT VFR BLD CALC: 38.7 % (ref 37–54)
HEMOGLOBIN: 12.7 G/DL (ref 12.5–16.5)
IMMATURE GRANULOCYTES #: 0.03 E9/L
IMMATURE GRANULOCYTES %: 0.3 % (ref 0–5)
INR BLD: 1
LACTIC ACID: 0.6 MMOL/L (ref 0.5–2.2)
LYMPHOCYTES ABSOLUTE: 2.67 E9/L (ref 1.5–4)
LYMPHOCYTES RELATIVE PERCENT: 24.2 % (ref 20–42)
MCH RBC QN AUTO: 31.4 PG (ref 26–35)
MCHC RBC AUTO-ENTMCNC: 32.8 % (ref 32–34.5)
MCV RBC AUTO: 95.8 FL (ref 80–99.9)
MONOCYTES ABSOLUTE: 2.05 E9/L (ref 0.1–0.95)
MONOCYTES RELATIVE PERCENT: 18.6 % (ref 2–12)
NEUTROPHILS ABSOLUTE: 5.91 E9/L (ref 1.8–7.3)
NEUTROPHILS RELATIVE PERCENT: 53.5 % (ref 43–80)
PDW BLD-RTO: 12.8 FL (ref 11.5–15)
PLATELET # BLD: 135 E9/L (ref 130–450)
PMV BLD AUTO: 9.6 FL (ref 7–12)
POTASSIUM REFLEX MAGNESIUM: 4.3 MMOL/L (ref 3.5–5)
PROTHROMBIN TIME: 11 SEC (ref 9.3–12.4)
RBC # BLD: 4.04 E12/L (ref 3.8–5.8)
RBC # BLD: NORMAL 10*6/UL
SODIUM BLD-SCNC: 139 MMOL/L (ref 132–146)
TOTAL PROTEIN: 7.3 G/DL (ref 6.4–8.3)
WBC # BLD: 11 E9/L (ref 4.5–11.5)

## 2022-08-05 PROCEDURE — 71250 CT THORAX DX C-: CPT

## 2022-08-05 PROCEDURE — 99285 EMERGENCY DEPT VISIT HI MDM: CPT

## 2022-08-05 PROCEDURE — 71045 X-RAY EXAM CHEST 1 VIEW: CPT

## 2022-08-05 PROCEDURE — 0W9B30Z DRAINAGE OF LEFT PLEURAL CAVITY WITH DRAINAGE DEVICE, PERCUTANEOUS APPROACH: ICD-10-PCS | Performed by: EMERGENCY MEDICINE

## 2022-08-05 PROCEDURE — 85730 THROMBOPLASTIN TIME PARTIAL: CPT

## 2022-08-05 PROCEDURE — 96374 THER/PROPH/DIAG INJ IV PUSH: CPT

## 2022-08-05 PROCEDURE — 93005 ELECTROCARDIOGRAM TRACING: CPT | Performed by: EMERGENCY MEDICINE

## 2022-08-05 PROCEDURE — 1200000000 HC SEMI PRIVATE

## 2022-08-05 PROCEDURE — 85610 PROTHROMBIN TIME: CPT

## 2022-08-05 PROCEDURE — 85025 COMPLETE CBC W/AUTO DIFF WBC: CPT

## 2022-08-05 PROCEDURE — 70450 CT HEAD/BRAIN W/O DYE: CPT

## 2022-08-05 PROCEDURE — 6360000002 HC RX W HCPCS: Performed by: EMERGENCY MEDICINE

## 2022-08-05 PROCEDURE — 80053 COMPREHEN METABOLIC PANEL: CPT

## 2022-08-05 PROCEDURE — 83605 ASSAY OF LACTIC ACID: CPT

## 2022-08-05 PROCEDURE — 72125 CT NECK SPINE W/O DYE: CPT

## 2022-08-05 RX ORDER — MORPHINE SULFATE 2 MG/ML
4 INJECTION, SOLUTION INTRAMUSCULAR; INTRAVENOUS ONCE
Status: COMPLETED | OUTPATIENT
Start: 2022-08-05 | End: 2022-08-05

## 2022-08-05 RX ORDER — LIDOCAINE HYDROCHLORIDE 10 MG/ML
5 INJECTION, SOLUTION INFILTRATION; PERINEURAL ONCE
Status: DISCONTINUED | OUTPATIENT
Start: 2022-08-05 | End: 2022-08-07 | Stop reason: HOSPADM

## 2022-08-05 RX ORDER — FENTANYL CITRATE 50 UG/ML
75 INJECTION, SOLUTION INTRAMUSCULAR; INTRAVENOUS ONCE
Status: COMPLETED | OUTPATIENT
Start: 2022-08-05 | End: 2022-08-06

## 2022-08-05 RX ORDER — MORPHINE SULFATE 4 MG/ML
4 INJECTION, SOLUTION INTRAMUSCULAR; INTRAVENOUS ONCE
Status: DISCONTINUED | OUTPATIENT
Start: 2022-08-05 | End: 2022-08-05 | Stop reason: CLARIF

## 2022-08-05 RX ADMIN — MORPHINE SULFATE 4 MG: 2 INJECTION, SOLUTION INTRAMUSCULAR; INTRAVENOUS at 20:46

## 2022-08-05 ASSESSMENT — ENCOUNTER SYMPTOMS
NAUSEA: 0
DIARRHEA: 0
EYE REDNESS: 0
SINUS PAIN: 0
WHEEZING: 0
COUGH: 0
EYE PAIN: 0
ABDOMINAL PAIN: 0
SHORTNESS OF BREATH: 0
SORE THROAT: 0
EYE DISCHARGE: 0
SINUS PRESSURE: 0
BACK PAIN: 1
BACK PAIN: 0
VOMITING: 0
SHORTNESS OF BREATH: 1

## 2022-08-05 ASSESSMENT — PAIN DESCRIPTION - LOCATION: LOCATION: RIB CAGE

## 2022-08-05 ASSESSMENT — PAIN DESCRIPTION - FREQUENCY: FREQUENCY: CONTINUOUS

## 2022-08-05 ASSESSMENT — PAIN DESCRIPTION - DESCRIPTORS: DESCRIPTORS: SHARP;ACHING

## 2022-08-05 ASSESSMENT — PAIN - FUNCTIONAL ASSESSMENT: PAIN_FUNCTIONAL_ASSESSMENT: 0-10

## 2022-08-05 ASSESSMENT — PAIN DESCRIPTION - PAIN TYPE: TYPE: ACUTE PAIN

## 2022-08-05 ASSESSMENT — PAIN DESCRIPTION - ORIENTATION: ORIENTATION: LEFT

## 2022-08-05 ASSESSMENT — PAIN SCALES - GENERAL: PAINLEVEL_OUTOF10: 10

## 2022-08-05 NOTE — ED PROVIDER NOTES
Department of Emergency Medicine   ED  Provider Note  Admit Date/RoomTime: 8/5/2022  3:26 PM  ED Room: 12/12          History of Present Illness:  8/5/22, Time: 4:06 PM EDT  Chief Complaint   Patient presents with    Fall     Tripped and fell, landing on left back. Having left rib pain. Martha Fontenot is a 77 y.o. male presenting to the ED for fall, beginning yesterday. He now has left-sided flank pain. The complaint has been persistent, severe in severity, and worsened by palpation and movement. Patient states that he lost his balance and fell backwards. Patient states that he landed on his left side. He denies any his head. Denies loss conscious. His only complaint is left-sided rib pain. Patient has not done anything to make it better. Patient otherwise denies any other accompanying symptoms including but not limited to fevers, neck pain, chest pain, shortness of breath, nausea, vomiting, numbness, or weakness. He is not on any blood thinners. Review of Systems   Constitutional:  Negative for chills and fever. HENT:  Negative for ear pain, sinus pressure and sore throat. Eyes:  Negative for pain, discharge and redness. Respiratory:  Negative for cough, shortness of breath and wheezing. Cardiovascular:  Negative for chest pain. Gastrointestinal:  Negative for abdominal pain, diarrhea, nausea and vomiting. Genitourinary:  Positive for flank pain. Negative for dysuria and frequency. Musculoskeletal:  Negative for arthralgias and back pain. Skin:  Negative for rash and wound. Neurological:  Negative for weakness and headaches. Hematological:  Negative for adenopathy.    All other systems reviewed and are negative.       --------------------------------------------- PAST HISTORY ---------------------------------------------  Past Medical History:  has a past medical history of Arthritis, CAD (coronary artery disease), Carotid stenosis, Chronic back pain, Chronic obstructive pulmonary disease (COPD) ([facility] [address] Hyperlipidemia, Hypertension, Restless legs syndrome, and Right bundle branch block. Past Surgical History:  has a past surgical history that includes Cardiac surgery; Elbow surgery; knee surgery (1970); shoulder surgery; Tonsillectomy; ECHO Compl W Dop Color Flow (8/23/2012); Diagnostic Cardiac Cath Lab Procedure (7/2009); Coronary artery bypass graft (7/27/2009); Dental surgery (6/16/2011); Vasectomy; and Colonoscopy (06/2021). Social History:  reports that he quit smoking about 7 years ago. His smoking use included cigarettes. He has a 30.00 pack-year smoking history. He has quit using smokeless tobacco.  His smokeless tobacco use included chew. He reports that he does not currently use alcohol. He reports that he does not use drugs. Family History: family history is not on file. . Unless otherwise noted, family history is non contributory    The patients home medications have been reviewed. Allergies: Bee venom and Metoprolol        ---------------------------------------------------PHYSICAL EXAM--------------------------------------    Physical Exam  Vitals and nursing note reviewed. Constitutional:       General: He is not in acute distress. Appearance: He is well-developed. HENT:      Head: Normocephalic and atraumatic. Eyes:      Conjunctiva/sclera: Conjunctivae normal.   Cardiovascular:      Rate and Rhythm: Normal rate and regular rhythm. Heart sounds: Normal heart sounds. No murmur heard. Pulmonary:      Effort: Pulmonary effort is normal. No respiratory distress. Breath sounds: No wheezing or rales. Comments: Decreased breath sounds on the left  Abdominal:      General: Bowel sounds are normal.      Palpations: Abdomen is soft. Tenderness: There is no abdominal tenderness. There is no guarding or rebound. Musculoskeletal:         General: Tenderness (L Back) present. No deformity.       Cervical back: Normal range of motion and neck supple. Skin:     General: Skin is warm and dry. Neurological:      Mental Status: He is alert and oriented to person, place, and time. Cranial Nerves: No cranial nerve deficit. Coordination: Coordination normal.          -------------------------------------------------- RESULTS -------------------------------------------------  I have personally reviewed all laboratory and imaging results for this patient. Results are listed below. LABS: (Lab results interpreted by me)  No results found for this visit on 08/05/22.,       RADIOLOGY:  Interpreted by Radiologist unless otherwise specified  CT Head WO Contrast   Final Result   Mild cortical atrophy with small vessel ischemic disease. There is no acute   findings. Left maxillary sinus disease. CT cervical spine. Comparison 07/15/2011      Findings      There is no acute displaced fracture in the cervical spine. A well   corticated bony fragment is identified at the tip of the dense without   change. There is mild degenerative changes from C3-T1. The prevertebral   soft tissues are normal.  There is a pneumothorax in the left lung apex which   will be discussed in the CT of the chest.  There is calcification and   stenosis of the carotid arteries. Impression      No acute fractures. Mild degenerative changes from C3-T1. Left apical pneumothorax. CT chest.      The heart size is normal.  There is diffuse coronary artery calcification. The great vessels are normal.  Moderately enlarged mediastinal lymph nodes   measuring up to 7 mm are noted. Fractures of the left 8, 9 and 10th ribs   posterolaterally are identified. There is nearly 20-25% left inferior and   lateral pneumothorax with atelectasis/pleural effusion in the left lower   lobe. There is minimal atelectasis in the right lung base as well.   A   cortical irregularity is noted in the body of the sternum which could   represent a nondisplaced sternal fracture. Liver is normal.  Gallbladder is   distended. Consider ultrasonography. There is ectatic abdominal aorta   measuring 2.4 x 2.5 cm. Consider dedicated CT of the abdomen and pelvis. 1.5 cm left renal cyst is noted. Impression      Fractures of the left 8, 9 and 10th ribs laterally with nearly 20-25% left   basilar hydro pneumothorax with atelectasis in the left base. There is also   scarring/atelectasis in the right lung base. Cortical irregularity of the sternum concerning for nondisplaced sternal   fracture. Clinical assessment is recommended. Critical results were called by Dr. Radha Handley to or Dr. David Duran. On   8/5/2022 at 16:56. CT Cervical Spine WO Contrast   Final Result   Mild cortical atrophy with small vessel ischemic disease. There is no acute   findings. Left maxillary sinus disease. CT cervical spine. Comparison 07/15/2011      Findings      There is no acute displaced fracture in the cervical spine. A well   corticated bony fragment is identified at the tip of the dense without   change. There is mild degenerative changes from C3-T1. The prevertebral   soft tissues are normal.  There is a pneumothorax in the left lung apex which   will be discussed in the CT of the chest.  There is calcification and   stenosis of the carotid arteries. Impression      No acute fractures. Mild degenerative changes from C3-T1. Left apical pneumothorax. CT chest.      The heart size is normal.  There is diffuse coronary artery calcification. The great vessels are normal.  Moderately enlarged mediastinal lymph nodes   measuring up to 7 mm are noted. Fractures of the left 8, 9 and 10th ribs   posterolaterally are identified. There is nearly 20-25% left inferior and   lateral pneumothorax with atelectasis/pleural effusion in the left lower   lobe. There is minimal atelectasis in the right lung base as well.   A cortical irregularity is noted in the body of the sternum which could   represent a nondisplaced sternal fracture. Liver is normal.  Gallbladder is   distended. Consider ultrasonography. There is ectatic abdominal aorta   measuring 2.4 x 2.5 cm. Consider dedicated CT of the abdomen and pelvis. 1.5 cm left renal cyst is noted. Impression      Fractures of the left 8, 9 and 10th ribs laterally with nearly 20-25% left   basilar hydro pneumothorax with atelectasis in the left base. There is also   scarring/atelectasis in the right lung base. Cortical irregularity of the sternum concerning for nondisplaced sternal   fracture. Clinical assessment is recommended. Critical results were called by Dr. Jairo Kendrick to or Dr. Quin Benjamin. On   8/5/2022 at 16:56. CT CHEST WO CONTRAST   Final Result   Mild cortical atrophy with small vessel ischemic disease. There is no acute   findings. Left maxillary sinus disease. CT cervical spine. Comparison 07/15/2011      Findings      There is no acute displaced fracture in the cervical spine. A well   corticated bony fragment is identified at the tip of the dense without   change. There is mild degenerative changes from C3-T1. The prevertebral   soft tissues are normal.  There is a pneumothorax in the left lung apex which   will be discussed in the CT of the chest.  There is calcification and   stenosis of the carotid arteries. Impression      No acute fractures. Mild degenerative changes from C3-T1. Left apical pneumothorax. CT chest.      The heart size is normal.  There is diffuse coronary artery calcification. The great vessels are normal.  Moderately enlarged mediastinal lymph nodes   measuring up to 7 mm are noted. Fractures of the left 8, 9 and 10th ribs   posterolaterally are identified.   There is nearly 20-25% left inferior and   lateral pneumothorax with atelectasis/pleural effusion in the left lower [BB]      ED Course User Index  [BB] Kaushal Luna DO        Re-Evaluations:  Patient was reevaluted and continue to be stable. .      This patient's ED course included: a personal history and physicial examination, re-evaluation prior to disposition, cardiac monitoring, continuous pulse oximetry, and complex medical decision making and emergency management    This patient has remained hemodynamically stable during their ED course. Consultations:  [facility] ED      Critical Care: 37 minutes       Counseling: The emergency provider has spoken with the patient and discussed todays results, in addition to providing specific details for the plan of care and counseling regarding the diagnosis and prognosis. Questions are answered at this time and they are agreeable with the plan.       --------------------------------- IMPRESSION AND DISPOSITION ---------------------------------    IMPRESSION  1. Traumatic pneumothorax, initial encounter    2. Closed fracture of multiple ribs of left side, initial encounter        DISPOSITION  Disposition: Transfer to [facility] to the ED  Patient condition is stable        NOTE: This report was transcribed using voice recognition software.  Every effort was made to ensure accuracy; however, inadvertent computerized transcription errors may be present           Kaushal Luna DO  Resident  08/05/22 0185

## 2022-08-05 NOTE — ED NOTES
Patient picked up by [facility] for transport to [id number] [place] [address],[address], RN  08/05/22 1269

## 2022-08-06 ENCOUNTER — APPOINTMENT (OUTPATIENT)
Dept: GENERAL RADIOLOGY | Age: 67
DRG: 200 | End: 2022-08-06
Payer: COMMERCIAL

## 2022-08-06 ENCOUNTER — APPOINTMENT (OUTPATIENT)
Dept: CT IMAGING | Age: 67
DRG: 200 | End: 2022-08-06
Payer: COMMERCIAL

## 2022-08-06 PROBLEM — J93.9 PNEUMOTHORAX: Status: ACTIVE | Noted: 2022-08-06

## 2022-08-06 LAB
ANION GAP SERPL CALCULATED.3IONS-SCNC: 11 MMOL/L (ref 7–16)
BASOPHILS ABSOLUTE: 0 E9/L (ref 0–0.2)
BASOPHILS RELATIVE PERCENT: 0.1 % (ref 0–2)
BUN BLDV-MCNC: 11 MG/DL (ref 6–23)
CALCIUM SERPL-MCNC: 8.7 MG/DL (ref 8.6–10.2)
CHLORIDE BLD-SCNC: 101 MMOL/L (ref 98–107)
CO2: 24 MMOL/L (ref 22–29)
CREAT SERPL-MCNC: 0.8 MG/DL (ref 0.7–1.2)
EOSINOPHILS ABSOLUTE: 0.24 E9/L (ref 0.05–0.5)
EOSINOPHILS RELATIVE PERCENT: 1.7 % (ref 0–6)
GFR AFRICAN AMERICAN: >60
GFR NON-AFRICAN AMERICAN: >60 ML/MIN/1.73
GLUCOSE BLD-MCNC: 113 MG/DL (ref 74–99)
HCT VFR BLD CALC: 36.5 % (ref 37–54)
HEMOGLOBIN: 12.4 G/DL (ref 12.5–16.5)
LYMPHOCYTES ABSOLUTE: 1.14 E9/L (ref 1.5–4)
LYMPHOCYTES RELATIVE PERCENT: 7.8 % (ref 20–42)
MCH RBC QN AUTO: 32.6 PG (ref 26–35)
MCHC RBC AUTO-ENTMCNC: 34 % (ref 32–34.5)
MCV RBC AUTO: 96.1 FL (ref 80–99.9)
MONOCYTES ABSOLUTE: 1.86 E9/L (ref 0.1–0.95)
MONOCYTES RELATIVE PERCENT: 13.1 % (ref 2–12)
NEUTROPHILS ABSOLUTE: 11.01 E9/L (ref 1.8–7.3)
NEUTROPHILS RELATIVE PERCENT: 77.4 % (ref 43–80)
NUCLEATED RED BLOOD CELLS: 0.9 /100 WBC
PDW BLD-RTO: 13.1 FL (ref 11.5–15)
PLATELET # BLD: 125 E9/L (ref 130–450)
PMV BLD AUTO: 10 FL (ref 7–12)
POIKILOCYTES: ABNORMAL
POTASSIUM REFLEX MAGNESIUM: 4.2 MMOL/L (ref 3.5–5)
RBC # BLD: 3.8 E12/L (ref 3.8–5.8)
SODIUM BLD-SCNC: 136 MMOL/L (ref 132–146)
STOMATOCYTES: ABNORMAL
WBC # BLD: 14.3 E9/L (ref 4.5–11.5)

## 2022-08-06 PROCEDURE — 6360000002 HC RX W HCPCS

## 2022-08-06 PROCEDURE — 6360000002 HC RX W HCPCS: Performed by: EMERGENCY MEDICINE

## 2022-08-06 PROCEDURE — 36415 COLL VENOUS BLD VENIPUNCTURE: CPT

## 2022-08-06 PROCEDURE — 71045 X-RAY EXAM CHEST 1 VIEW: CPT

## 2022-08-06 PROCEDURE — 6360000002 HC RX W HCPCS: Performed by: STUDENT IN AN ORGANIZED HEALTH CARE EDUCATION/TRAINING PROGRAM

## 2022-08-06 PROCEDURE — 97165 OT EVAL LOW COMPLEX 30 MIN: CPT

## 2022-08-06 PROCEDURE — 99222 1ST HOSP IP/OBS MODERATE 55: CPT | Performed by: SURGERY

## 2022-08-06 PROCEDURE — 0WPBX3Z REMOVAL OF INFUSION DEVICE FROM LEFT PLEURAL CAVITY, EXTERNAL APPROACH: ICD-10-PCS | Performed by: SURGERY

## 2022-08-06 PROCEDURE — 85025 COMPLETE CBC W/AUTO DIFF WBC: CPT

## 2022-08-06 PROCEDURE — 6360000004 HC RX CONTRAST MEDICATION: Performed by: RADIOLOGY

## 2022-08-06 PROCEDURE — 6370000000 HC RX 637 (ALT 250 FOR IP): Performed by: STUDENT IN AN ORGANIZED HEALTH CARE EDUCATION/TRAINING PROGRAM

## 2022-08-06 PROCEDURE — 1200000000 HC SEMI PRIVATE

## 2022-08-06 PROCEDURE — 74177 CT ABD & PELVIS W/CONTRAST: CPT

## 2022-08-06 PROCEDURE — 2580000003 HC RX 258: Performed by: STUDENT IN AN ORGANIZED HEALTH CARE EDUCATION/TRAINING PROGRAM

## 2022-08-06 PROCEDURE — 80048 BASIC METABOLIC PNL TOTAL CA: CPT

## 2022-08-06 PROCEDURE — 97161 PT EVAL LOW COMPLEX 20 MIN: CPT

## 2022-08-06 RX ORDER — HYDROCODONE BITARTRATE AND ACETAMINOPHEN 10; 325 MG/1; MG/1
1 TABLET ORAL EVERY 6 HOURS PRN
Status: DISCONTINUED | OUTPATIENT
Start: 2022-08-06 | End: 2022-08-07 | Stop reason: HOSPADM

## 2022-08-06 RX ORDER — ENOXAPARIN SODIUM 100 MG/ML
30 INJECTION SUBCUTANEOUS 2 TIMES DAILY
Status: DISCONTINUED | OUTPATIENT
Start: 2022-08-06 | End: 2022-08-07 | Stop reason: HOSPADM

## 2022-08-06 RX ORDER — ATORVASTATIN CALCIUM 40 MG/1
40 TABLET, FILM COATED ORAL DAILY
Status: DISCONTINUED | OUTPATIENT
Start: 2022-08-06 | End: 2022-08-07 | Stop reason: HOSPADM

## 2022-08-06 RX ORDER — SODIUM CHLORIDE 9 MG/ML
INJECTION, SOLUTION INTRAVENOUS PRN
Status: DISCONTINUED | OUTPATIENT
Start: 2022-08-06 | End: 2022-08-07 | Stop reason: HOSPADM

## 2022-08-06 RX ORDER — ONDANSETRON 4 MG/1
4 TABLET, ORALLY DISINTEGRATING ORAL EVERY 8 HOURS PRN
Status: DISCONTINUED | OUTPATIENT
Start: 2022-08-06 | End: 2022-08-07 | Stop reason: HOSPADM

## 2022-08-06 RX ORDER — GABAPENTIN 100 MG/1
100 CAPSULE ORAL EVERY 8 HOURS
Status: DISCONTINUED | OUTPATIENT
Start: 2022-08-06 | End: 2022-08-07 | Stop reason: HOSPADM

## 2022-08-06 RX ORDER — METHOCARBAMOL 500 MG/1
1000 TABLET, FILM COATED ORAL 4 TIMES DAILY
Status: DISCONTINUED | OUTPATIENT
Start: 2022-08-06 | End: 2022-08-07 | Stop reason: HOSPADM

## 2022-08-06 RX ORDER — SODIUM CHLORIDE 0.9 % (FLUSH) 0.9 %
10 SYRINGE (ML) INJECTION PRN
Status: DISCONTINUED | OUTPATIENT
Start: 2022-08-06 | End: 2022-08-07 | Stop reason: HOSPADM

## 2022-08-06 RX ORDER — LIDOCAINE 4 G/G
1 PATCH TOPICAL DAILY
Status: DISCONTINUED | OUTPATIENT
Start: 2022-08-06 | End: 2022-08-07 | Stop reason: HOSPADM

## 2022-08-06 RX ORDER — ONDANSETRON 2 MG/ML
4 INJECTION INTRAMUSCULAR; INTRAVENOUS EVERY 6 HOURS PRN
Status: DISCONTINUED | OUTPATIENT
Start: 2022-08-06 | End: 2022-08-07 | Stop reason: HOSPADM

## 2022-08-06 RX ORDER — ACETAMINOPHEN 325 MG/1
650 TABLET ORAL EVERY 6 HOURS
Status: DISCONTINUED | OUTPATIENT
Start: 2022-08-06 | End: 2022-08-06

## 2022-08-06 RX ORDER — POLYETHYLENE GLYCOL 3350 17 G/17G
17 POWDER, FOR SOLUTION ORAL DAILY
Status: DISCONTINUED | OUTPATIENT
Start: 2022-08-06 | End: 2022-08-07 | Stop reason: HOSPADM

## 2022-08-06 RX ORDER — MORPHINE SULFATE 15 MG/1
15 TABLET, FILM COATED, EXTENDED RELEASE ORAL 3 TIMES DAILY
Status: DISCONTINUED | OUTPATIENT
Start: 2022-08-06 | End: 2022-08-07 | Stop reason: HOSPADM

## 2022-08-06 RX ORDER — FENTANYL CITRATE 50 UG/ML
INJECTION, SOLUTION INTRAMUSCULAR; INTRAVENOUS
Status: COMPLETED
Start: 2022-08-06 | End: 2022-08-06

## 2022-08-06 RX ORDER — FENTANYL CITRATE 50 UG/ML
50 INJECTION, SOLUTION INTRAMUSCULAR; INTRAVENOUS ONCE
Status: COMPLETED | OUTPATIENT
Start: 2022-08-06 | End: 2022-08-06

## 2022-08-06 RX ORDER — HYDRALAZINE HYDROCHLORIDE 20 MG/ML
10 INJECTION INTRAMUSCULAR; INTRAVENOUS
Status: DISCONTINUED | OUTPATIENT
Start: 2022-08-06 | End: 2022-08-07 | Stop reason: HOSPADM

## 2022-08-06 RX ORDER — AMLODIPINE BESYLATE 5 MG/1
10 TABLET ORAL DAILY
Status: DISCONTINUED | OUTPATIENT
Start: 2022-08-06 | End: 2022-08-07 | Stop reason: HOSPADM

## 2022-08-06 RX ORDER — SODIUM CHLORIDE 0.9 % (FLUSH) 0.9 %
10 SYRINGE (ML) INJECTION EVERY 12 HOURS SCHEDULED
Status: DISCONTINUED | OUTPATIENT
Start: 2022-08-06 | End: 2022-08-07 | Stop reason: HOSPADM

## 2022-08-06 RX ADMIN — AMLODIPINE BESYLATE 10 MG: 5 TABLET ORAL at 08:48

## 2022-08-06 RX ADMIN — METHOCARBAMOL 1000 MG: 500 TABLET ORAL at 08:47

## 2022-08-06 RX ADMIN — SODIUM CHLORIDE, PRESERVATIVE FREE 10 ML: 5 INJECTION INTRAVENOUS at 08:48

## 2022-08-06 RX ADMIN — ENOXAPARIN SODIUM 30 MG: 100 INJECTION SUBCUTANEOUS at 08:49

## 2022-08-06 RX ADMIN — IOPAMIDOL 75 ML: 755 INJECTION, SOLUTION INTRAVENOUS at 00:28

## 2022-08-06 RX ADMIN — FENTANYL CITRATE 75 MCG: 0.05 INJECTION, SOLUTION INTRAMUSCULAR; INTRAVENOUS at 01:45

## 2022-08-06 RX ADMIN — METHOCARBAMOL 1000 MG: 500 TABLET ORAL at 20:48

## 2022-08-06 RX ADMIN — METHOCARBAMOL 1000 MG: 500 TABLET ORAL at 13:44

## 2022-08-06 RX ADMIN — ATORVASTATIN CALCIUM 40 MG: 40 TABLET, FILM COATED ORAL at 08:48

## 2022-08-06 RX ADMIN — BISACODYL 5 MG: 5 TABLET, COATED ORAL at 08:49

## 2022-08-06 RX ADMIN — FENTANYL CITRATE 75 MCG: 50 INJECTION, SOLUTION INTRAMUSCULAR; INTRAVENOUS at 01:45

## 2022-08-06 RX ADMIN — MORPHINE SULFATE 15 MG: 15 TABLET, FILM COATED, EXTENDED RELEASE ORAL at 13:44

## 2022-08-06 RX ADMIN — GABAPENTIN 100 MG: 100 CAPSULE ORAL at 17:05

## 2022-08-06 RX ADMIN — SODIUM CHLORIDE, PRESERVATIVE FREE 10 ML: 5 INJECTION INTRAVENOUS at 20:49

## 2022-08-06 RX ADMIN — POLYETHYLENE GLYCOL 3350 17 G: 17 POWDER, FOR SOLUTION ORAL at 08:49

## 2022-08-06 RX ADMIN — ENOXAPARIN SODIUM 30 MG: 100 INJECTION SUBCUTANEOUS at 20:47

## 2022-08-06 RX ADMIN — GABAPENTIN 100 MG: 100 CAPSULE ORAL at 08:48

## 2022-08-06 RX ADMIN — MORPHINE SULFATE 15 MG: 15 TABLET, FILM COATED, EXTENDED RELEASE ORAL at 20:48

## 2022-08-06 RX ADMIN — HYDROCODONE BITARTRATE AND ACETAMINOPHEN 1 TABLET: 10; 325 TABLET ORAL at 11:22

## 2022-08-06 RX ADMIN — MORPHINE SULFATE 15 MG: 15 TABLET, FILM COATED, EXTENDED RELEASE ORAL at 08:47

## 2022-08-06 RX ADMIN — METHOCARBAMOL 1000 MG: 500 TABLET ORAL at 17:06

## 2022-08-06 RX ADMIN — FENTANYL CITRATE 50 MCG: 50 INJECTION, SOLUTION INTRAMUSCULAR; INTRAVENOUS at 01:25

## 2022-08-06 RX ADMIN — HYDROCODONE BITARTRATE AND ACETAMINOPHEN 1 TABLET: 10; 325 TABLET ORAL at 04:16

## 2022-08-06 ASSESSMENT — PAIN SCALES - GENERAL
PAINLEVEL_OUTOF10: 10
PAINLEVEL_OUTOF10: 10
PAINLEVEL_OUTOF10: 8
PAINLEVEL_OUTOF10: 10
PAINLEVEL_OUTOF10: 10
PAINLEVEL_OUTOF10: 8
PAINLEVEL_OUTOF10: 8
PAINLEVEL_OUTOF10: 6

## 2022-08-06 ASSESSMENT — PAIN DESCRIPTION - FREQUENCY
FREQUENCY: CONTINUOUS
FREQUENCY: CONTINUOUS

## 2022-08-06 ASSESSMENT — PAIN DESCRIPTION - DESCRIPTORS
DESCRIPTORS: SHARP;SORE
DESCRIPTORS: ACHING;DISCOMFORT
DESCRIPTORS: DISCOMFORT
DESCRIPTORS: ACHING

## 2022-08-06 ASSESSMENT — PAIN DESCRIPTION - ORIENTATION
ORIENTATION: LEFT
ORIENTATION: LOWER;LEFT

## 2022-08-06 ASSESSMENT — PAIN DESCRIPTION - LOCATION
LOCATION: CHEST
LOCATION: BACK
LOCATION: CHEST;RIB CAGE
LOCATION: CHEST;BACK

## 2022-08-06 ASSESSMENT — PAIN - FUNCTIONAL ASSESSMENT
PAIN_FUNCTIONAL_ASSESSMENT: PREVENTS OR INTERFERES SOME ACTIVE ACTIVITIES AND ADLS
PAIN_FUNCTIONAL_ASSESSMENT: PREVENTS OR INTERFERES SOME ACTIVE ACTIVITIES AND ADLS

## 2022-08-06 ASSESSMENT — PAIN DESCRIPTION - PAIN TYPE
TYPE: ACUTE PAIN;CHRONIC PAIN
TYPE: ACUTE PAIN

## 2022-08-06 ASSESSMENT — PAIN DESCRIPTION - ONSET: ONSET: ON-GOING

## 2022-08-06 NOTE — PROGRESS NOTES
Called to bedside for bleeding from chest tube site, small subcutaneous hematoma present with no brisk bleeding. 2-0 Ethilon suture used to close the site, no further bleeding noted. Covered with occlusive dressing. Repeat CXR pending.      Electronically signed by Buffy Wilson MD on 8/6/2022 at 6:02 PM

## 2022-08-06 NOTE — H&P
TRAUMA HISTORY & PHYSICAL  Surgical Resident/Advance Practice Nurse  8/5/2022  10:48 PM    PRIMARY SURVEY    CHIEF COMPLAINT:  Trauma consult. Injury occurred yesterday. Patient was walking in his house and tripped over a cat toy. He landed on his back. He did not hit his head. He denies LOC. He had persistent left sided chest pain, so he presented to the emergency department. Patient denies pain elsewhere. Patient on 6 L of O2. He does not wear any oxygen at home. He is GCS 15 and neurologically intact. He is on ASA. He underwent CT head, c-spine, and chest which revealed left 8-10 rib fractures and 20-25% pneumothorax. Chest tube to be inserted by emergency room staff. Patient reportedly follows with pain clinic for chronic shoulder and back pain. He takes 15 mg morphine TID and Philo.    AIRWAY:   Airway Normal  EMS ETT Absent  Noisy respirations Absent  Retractions: Absent  Vomiting/bleeding: Absent      BREATHING:    Midaxillary breath sound left:  Normal  Midaxillary breath sound right:  Normal    Cough sound intensity:  good   FiO2:  O2 6 L NC     mL.       CIRCULATION:   Femerol pulse intensity: Strong  Palpebral conjunctiva: Pink     Vitals:    08/05/22 1941   BP: 135/73   Pulse: 72   Resp: 16   Temp: 98.4 °F (36.9 °C)   SpO2: 98%       Vitals:    08/05/22 1511 08/05/22 1531 08/05/22 1941   BP: (!) 149/69  135/73   Pulse: 73  72   Resp: 16  16   Temp: 98.4 °F (36.9 °C)  98.4 °F (36.9 °C)   SpO2: 94%  98%   Weight:  160 lb (72.6 kg)    Height:  5' 11\" (1.803 m)         FAST EXAM: Deferred    Central Nervous System    GCS Initial 15 minutes   Eye  Motor  Verbal 4 - Opens eyes on own  6 - Follows simple motor commands  5 - Alert and oriented 4 - Opens eyes on own  6 - Follows simple motor commands  5 - Alert and oriented     Neuromuscular blockade: No  Pupil size:  Left 4 mm    Right 4 mm  Pupil reaction: Yes    Wiggles fingers: Left Yes Right Yes  Wiggles toes: Left Yes   Right Yes    Hand grasp: Left  Present      Right  Present  Plantar flexion: Left  Present      Right   Present    Loss of consciousness:  No    History Obtained From:  Patient & wife  Private Medical Doctor: Ramos Mckinney MD    Pre-exisiting Medical History:  yes    Conditions:   Past Medical History:   Diagnosis Date    Arthritis     CAD (coronary artery disease)     Carotid stenosis     Chronic back pain     Chronic obstructive pulmonary disease (COPD) ([facility] [address] 10/10/2019    Hyperlipidemia     Hypertension     Restless legs syndrome     Right bundle branch block 6/2011    New RBBB on EKG. Medications:   No current facility-administered medications on file prior to encounter. Current Outpatient Medications on File Prior to Encounter   Medication Sig Dispense Refill    meclizine (ANTIVERT) 25 MG tablet Take 25 mg by mouth 3 times daily as needed      lisinopril (PRINIVIL;ZESTRIL) 5 MG tablet Take 1 tablet by mouth 2 times daily 60 tablet 3    atorvastatin (LIPITOR) 40 MG tablet Take 1 tablet by mouth daily 90 tablet 3    aspirin 81 MG tablet Take 81 mg by mouth daily      amLODIPine (NORVASC) 10 MG tablet Take 10 mg by mouth daily      Omega-3 Fatty Acids (FISH OIL) 1200 MG CAPS Take by mouth 2 times daily      HYDROcodone-acetaminophen (NORCO)  MG per tablet Take 1 tablet by mouth every 6 hours as needed. morphine sulfate ER (MS CONTIN) 15 MG CR tablet   Take 15 mg by mouth 3 times daily       Multiple Vitamin (THERA) TABS   Take by mouth daily          Allergies: Allergies   Allergen Reactions    Bee Venom      Swelling, rash    Metoprolol      nightmares         Social History:   Tobacco use:  occasional  Alcohol use:  none  Illicit drug use:  no history of illicit drug use    Past Surgical History:    Past Surgical History:   Procedure Laterality Date    CARDIAC SURGERY      COLONOSCOPY  06/2021    CORONARY ARTERY BYPASS GRAFT  7/27/2009    [facility]. CABG x 3.     DENTAL SURGERY  6/16/2011    Full dental extraction. DIAGNOSTIC CARDIAC CATH LAB PROCEDURE  7/2009    [facility]. ECHO COMPL W DOP COLOR FLOW  8/23/2012         ELBOW SURGERY      lt    KNEE SURGERY  1970    rt    SHOULDER SURGERY      bilateral    TONSILLECTOMY      VASECTOMY         Anticoagulant use: None  Antiplatelet use:   None    NSAID use in last 72 hours: no  Taken PCN in past:  yes  Last food/drink: this morning  Last tetanus: up-to-date    Family History:   No family history of anesthesia complications    Complaints:   Head:  None  Neck:   None  Chest:   Moderate  Back:   None  Abdomen:   None  Extremities:   None  Comments: none    Review of systems:  All negative unless otherwise noted. SECONDARY SURVEY  Head/scalp: Atraumatic    Face: Atraumatic    Eyes/ears/nose: Atraumatic    Pharynx/mouth: Atraumatic    Neck: Atraumatic     Cervical spine tenderness:   Cervical collar not indicated  Pain:  none  ROM:  Normal    Chest wall:  Atraumatic. TTP L chest wall    Heart:  Regular rate & rhythm    Abdomen: Atraumatic. Soft ND  Tenderness:  none    Pelvis: Atraumatic  Tenderness: none    Thoracolumbar spine: Atraumatic  Tenderness:  none    Genitourinary:  Atraumatic. No blood or urine noted    Rectum: Atraumatic. No blood noted. Perineum: Atraumatic. No blood or urine noted. Extremities:   Sensory normal  Motor normal    Distal Pulses  Left arm normal  Right arm normal  Left leg normal  Right leg normal    Capillary refill  Left arm normal  Right arm normal  Left leg normal  Right leg normal    Procedures in ED:   none    In the event of Emergency Blood Transfusion:  Due to the critical condition of this patient, I request the immediate release of blood products for emergency transfusion secondary to shock. I understand the increased risks incurred by the lack of complete transfusion testing.       Radiology: Chest Xray , CT Head , CT Cervical spine , and CT Chest     Consultations:   none    Admission/Diagnosis: L 8-10 rib fx, L pneumothorax    Plan of Treatment:  Admit general floor  Chest tube insertion- maintain to continuous wall suction  CT AP  Tert  Pain/nausea control prn  Aggressive pulmonary hygiene    Plan discussed with Dr. Marion Emmanuel at 8/5/2022 on 10:48 PM    Electronically signed by Vicente Gupta MD on 8/5/2022 at 10:48 PM

## 2022-08-06 NOTE — DISCHARGE SUMMARY
Physician Discharge Summary     Patient ID:  Timothy Cervantes  [id number]  23 y.o.  1955    Admit date: 8/5/2022    Discharge date and time: No discharge date for patient encounter. Admitting Physician: Luann Bright MD     Admission Diagnoses: Trauma [T14.90XA]  Traumatic pneumothorax, initial encounter [S27. 0XXA]  Closed fracture of multiple ribs of left side, initial encounter [S22.42XA]    Discharge Diagnoses: Principal Problem:    Trauma  Active Problems:    Pneumothorax  Resolved Problems:    * No resolved hospital problems. *      Admission Condition: poor    Discharged Condition: stable    Indication for Admission: L pneumothorax    Hospital Course/Procedures/Operation/treatments:   8/5: Trauma consult. Mechanical fall. Found to have traumatic left pneumothorax. L chest tube placed by ED.  8/6: No new findings on tertiary exam. Chest tube without air leak. 10 cc sanguinous drainage. 8/7: Patient had left-sided chest tube pulled yesterday with repeat chest x-ray demonstrating no residual pneumothorax. Patient was noted to have bleeding from chest tube removal site later in the day after removal of chest tube. 1 stitch was put in the chest tube site with control of bleeding. Pt stable for discharge. Consults:   IP CONSULT TO TRAUMA SURGERY    Significant Diagnostic Studies:   CT Head WO Contrast    Result Date: 8/5/2022  EXAMINATION: CT OF THE CHEST WITHOUT CONTRAST; CT OF THE HEAD WITHOUT CONTRAST; CT OF THE CERVICAL SPINE WITHOUT CONTRAST 8/5/2022 4:28 pm TECHNIQUE: CT of the chest was performed without the administration of intravenous contrast. Multiplanar reformatted images are provided for review.  Automated exposure control, iterative reconstruction, and/or weight based adjustment of the mA/kV was utilized to reduce the radiation dose to as low as reasonably achievable.; CT of the head was performed without the administration of intravenous contrast. Automated exposure control, iterative reconstruction, and/or weight based adjustment of the mA/kV was utilized to reduce the radiation dose to as low as reasonably achievable.; CT of the cervical spine was performed without the administration of intravenous contrast. Multiplanar reformatted images are provided for review. Automated exposure control, iterative reconstruction, and/or weight based adjustment of the mA/kV was utilized to reduce the radiation dose to as low as reasonably achievable. COMPARISON: None. HISTORY: ORDERING SYSTEM PROVIDED HISTORY: Fall, Left rib pain TECHNOLOGIST PROVIDED HISTORY: Reason for exam:->Fall, Left rib pain Decision Support Exception - unselect if not a suspected or confirmed emergency medical condition->Emergency Medical Condition (MA) FINDINGS: CT head. There is mild cortical atrophy with prominence of the sulci. The ventricles are of normal size and configuration. Punctate areas of decreased attenuation are present in the periventricular white matter and brainstem consistent with microvascular/ischemic changes. There is no acute stroke, mass or hemorrhage. There is sinus disease in the left maxillary sinus. Mild cortical atrophy with small vessel ischemic disease. There is no acute findings. Left maxillary sinus disease. CT cervical spine. Comparison 07/15/2011 Findings There is no acute displaced fracture in the cervical spine. A well corticated bony fragment is identified at the tip of the dense without change. There is mild degenerative changes from C3-T1. The prevertebral soft tissues are normal.  There is a pneumothorax in the left lung apex which will be discussed in the CT of the chest.  There is calcification and stenosis of the carotid arteries. Impression No acute fractures. Mild degenerative changes from C3-T1. Left apical pneumothorax. CT chest. The heart size is normal.  There is diffuse coronary artery calcification.  The great vessels are normal.  Moderately enlarged mediastinal images are provided for review. Automated exposure control, iterative reconstruction, and/or weight based adjustment of the mA/kV was utilized to reduce the radiation dose to as low as reasonably achievable. COMPARISON: None. HISTORY: ORDERING SYSTEM PROVIDED HISTORY: Fall, Left rib pain TECHNOLOGIST PROVIDED HISTORY: Reason for exam:->Fall, Left rib pain Decision Support Exception - unselect if not a suspected or confirmed emergency medical condition->Emergency Medical Condition (MA) FINDINGS: CT head. There is mild cortical atrophy with prominence of the sulci. The ventricles are of normal size and configuration. Punctate areas of decreased attenuation are present in the periventricular white matter and brainstem consistent with microvascular/ischemic changes. There is no acute stroke, mass or hemorrhage. There is sinus disease in the left maxillary sinus. Mild cortical atrophy with small vessel ischemic disease. There is no acute findings. Left maxillary sinus disease. CT cervical spine. Comparison 07/15/2011 Findings There is no acute displaced fracture in the cervical spine. A well corticated bony fragment is identified at the tip of the dense without change. There is mild degenerative changes from C3-T1. The prevertebral soft tissues are normal.  There is a pneumothorax in the left lung apex which will be discussed in the CT of the chest.  There is calcification and stenosis of the carotid arteries. Impression No acute fractures. Mild degenerative changes from C3-T1. Left apical pneumothorax. CT chest. The heart size is normal.  There is diffuse coronary artery calcification. The great vessels are normal.  Moderately enlarged mediastinal lymph nodes measuring up to 7 mm are noted. Fractures of the left 8, 9 and 10th ribs posterolaterally are identified. There is nearly 20-25% left inferior and lateral pneumothorax with atelectasis/pleural effusion in the left lower lobe.   There is minimal atelectasis in the right lung base as well. A cortical irregularity is noted in the body of the sternum which could represent a nondisplaced sternal fracture. Liver is normal.  Gallbladder is distended. Consider ultrasonography. There is ectatic abdominal aorta measuring 2.4 x 2.5 cm. Consider dedicated CT of the abdomen and pelvis. 1.5 cm left renal cyst is noted. Impression Fractures of the left 8, 9 and 10th ribs laterally with nearly 20-25% left basilar hydro pneumothorax with atelectasis in the left base. There is also scarring/atelectasis in the right lung base. Cortical irregularity of the sternum concerning for nondisplaced sternal fracture. Clinical assessment is recommended. Critical results were called by Dr. Randy Saavedra to or Dr. Gaetano Galeano. On 8/5/2022 at 16:56. CT Cervical Spine WO Contrast    Result Date: 8/5/2022  EXAMINATION: CT OF THE CHEST WITHOUT CONTRAST; CT OF THE HEAD WITHOUT CONTRAST; CT OF THE CERVICAL SPINE WITHOUT CONTRAST 8/5/2022 4:28 pm TECHNIQUE: CT of the chest was performed without the administration of intravenous contrast. Multiplanar reformatted images are provided for review. Automated exposure control, iterative reconstruction, and/or weight based adjustment of the mA/kV was utilized to reduce the radiation dose to as low as reasonably achievable.; CT of the head was performed without the administration of intravenous contrast. Automated exposure control, iterative reconstruction, and/or weight based adjustment of the mA/kV was utilized to reduce the radiation dose to as low as reasonably achievable.; CT of the cervical spine was performed without the administration of intravenous contrast. Multiplanar reformatted images are provided for review. Automated exposure control, iterative reconstruction, and/or weight based adjustment of the mA/kV was utilized to reduce the radiation dose to as low as reasonably achievable. COMPARISON: None.  HISTORY: ORDERING SYSTEM PROVIDED HISTORY: Fall, Left rib pain TECHNOLOGIST PROVIDED HISTORY: Reason for exam:->Fall, Left rib pain Decision Support Exception - unselect if not a suspected or confirmed emergency medical condition->Emergency Medical Condition (MA) FINDINGS: CT head. There is mild cortical atrophy with prominence of the sulci. The ventricles are of normal size and configuration. Punctate areas of decreased attenuation are present in the periventricular white matter and brainstem consistent with microvascular/ischemic changes. There is no acute stroke, mass or hemorrhage. There is sinus disease in the left maxillary sinus. Mild cortical atrophy with small vessel ischemic disease. There is no acute findings. Left maxillary sinus disease. CT cervical spine. Comparison 07/15/2011 Findings There is no acute displaced fracture in the cervical spine. A well corticated bony fragment is identified at the tip of the dense without change. There is mild degenerative changes from C3-T1. The prevertebral soft tissues are normal.  There is a pneumothorax in the left lung apex which will be discussed in the CT of the chest.  There is calcification and stenosis of the carotid arteries. Impression No acute fractures. Mild degenerative changes from C3-T1. Left apical pneumothorax. CT chest. The heart size is normal.  There is diffuse coronary artery calcification. The great vessels are normal.  Moderately enlarged mediastinal lymph nodes measuring up to 7 mm are noted. Fractures of the left 8, 9 and 10th ribs posterolaterally are identified. There is nearly 20-25% left inferior and lateral pneumothorax with atelectasis/pleural effusion in the left lower lobe. There is minimal atelectasis in the right lung base as well. A cortical irregularity is noted in the body of the sternum which could represent a nondisplaced sternal fracture. Liver is normal.  Gallbladder is distended. Consider ultrasonography.   There is ectatic abdominal aorta measuring 2.4 x 2.5 cm. Consider dedicated CT of the abdomen and pelvis. 1.5 cm left renal cyst is noted. Impression Fractures of the left 8, 9 and 10th ribs laterally with nearly 20-25% left basilar hydro pneumothorax with atelectasis in the left base. There is also scarring/atelectasis in the right lung base. Cortical irregularity of the sternum concerning for nondisplaced sternal fracture. Clinical assessment is recommended. Critical results were called by Dr. Dimitri Escobar to or Dr. Keyana Venegas. On 8/5/2022 at 16:56. CT ABDOMEN PELVIS W IV CONTRAST Additional Contrast? None    Result Date: 8/6/2022  EXAMINATION: CT OF THE ABDOMEN AND PELVIS WITH CONTRAST8/6/2022 12:24 am TECHNIQUE: CT of the abdomen and pelvis was performed with the administration of intravenous contrast. Multiplanar reformatted images are provided for review. Automated exposure control, iterative reconstruction, and/or weight based adjustment of the mA/kV was utilized to reduce the radiation dose to as low as reasonably achievable. COMPARISON: None HISTORY: ORDERING SYSTEM PROVIDED HISTORY: trauma TECHNOLOGIST PROVIDED HISTORY: Reason for exam:->trauma Additional Contrast?->None Decision Support Exception - unselect if not a suspected or confirmed emergency medical condition->Emergency Medical Condition (MA) What reading provider will be dictating this exam?->CRC FINDINGS: THORACIC STRUCTURES: There is a left pneumothorax. There is atelectasis at both lung bases. LIVER:  The liver is normal in size, contour and attenuation. No focal mass. No intra or extrahepatic bile duct dilation. GALL BLADDER: Unremarkable. SPLEEN:  Unremarkable. PANCREAS:  Unremarkable. ADRENAL GLANDS:  Unremarkable. ESOPHAGUS AND STOMACH:  Unremarkable. BOWEL: Small bowel:  Unremarkable. Large bowel: The colon and rectum are of normal course and caliber. The appendix is within normal limits. There is no intraperitoneal free air or fluid.  There pneumothorax. No evidence of tension physiology. Discharge Exam:  Gen - no apparent distress  Neuro - Awake, alert, attentive    HEENT - PERRL 3mm conj pink   Lungs - non labored, BS clear b/l    Heart - RR no extra heart sounds    Abdomen - snt  Ext- ROM wnl nvi    Disposition: home    In process/preliminary results:  Outstanding Order Results       No orders found for last 30 day(s). Patient Instructions:   Current Discharge Medication List             Details   meclizine (ANTIVERT) 25 MG tablet Take 25 mg by mouth 3 times daily as needed      lisinopril (PRINIVIL;ZESTRIL) 5 MG tablet Take 1 tablet by mouth 2 times daily  Qty: 60 tablet, Refills: 3      atorvastatin (LIPITOR) 40 MG tablet Take 1 tablet by mouth daily  Qty: 90 tablet, Refills: 3      aspirin 81 MG tablet Take 81 mg by mouth daily      amLODIPine (NORVASC) 10 MG tablet Take 10 mg by mouth daily      Omega-3 Fatty Acids (FISH OIL) 1200 MG CAPS Take by mouth 2 times daily      HYDROcodone-acetaminophen (NORCO)  MG per tablet Take 1 tablet by mouth every 6 hours as needed. morphine sulfate ER (MS CONTIN) 15 MG CR tablet   Take 15 mg by mouth 3 times daily       Multiple Vitamin (THERA) TABS   Take by mouth daily            Discharge Instructions:    Chest Tube Removal: What to Expect at [address] [place] [address]     A chest tube is placed through the chest wall between two ribs. You may have had a chest tube put in to help your collapsed lung expand. Or the tube mayhave helped drain fluid from a chest infection or surgery. The tube was removed before you came home. You may have some pain in your chest from the cut (incision) where the tube was put in. For most people, the pain goes away after about 2 weeks. You will have a bandage taped over the wound. Your doctor will remove the bandage and examine the wound in about 2 days. It will take about 3 to 4 weeks for your incision to heal completely.  It mayleave a small scar that will fade with time. This care sheet gives you a general idea about how long it will take for you to recover. But each person recovers at a different pace. Follow the steps belowto feel better as quickly as possible. How can you care for yourself at home? Activity    Rest when you feel tired. Getting enough sleep will help you recover. Try to walk each day. Start by walking a little more than you did the day before. Bit by bit, increase the amount you walk. Walking boosts blood flow and helps prevent pneumonia and constipation. Avoid strenuous activities, such as bicycle riding, jogging, weight lifting, or aerobic exercise, until your doctor says it is okay. How soon you can return to work or your normal routine depends on what health problems you have. Talk with your doctor about how long it will take you to recover. You may shower after your bandage is removed. Pat the cut (incision) dry. Do not take a bath for 2 weeks after your chest tube is out, or until your doctor tells you it is okay. Practice deep breathing exercises as directed by your doctor. Coughing exercises also can help drain fluid out of your chest.   Diet    You can eat your normal diet. If your stomach is upset, try bland, low-fat foods like plain rice, broiled chicken, toast, and yogurt. Drink plenty of fluids (unless your doctor tells you not to). Medicines    Your doctor will tell you if and when you can restart your medicines. You will also get instructions about taking any new medicines. If you take aspirin or some other blood thinner, ask your doctor if and when to start taking it again. Make sure that you understand exactly what your doctor wants you to do. Be safe with medicines. Take pain medicines exactly as directed. If the doctor gave you a prescription medicine for pain, take it as prescribed.   If you are not taking a prescription pain medicine, ask your doctor if you can take an over-the-counter

## 2022-08-06 NOTE — PROGRESS NOTES
[address]                                                   Patient Name: Jesus Blandon     MRN: [id number]     : 1955     Room: [address] [place], [place] [address]       Evaluating OT: Leslie WELDON, OTR/L, [id number]      Referring Provider: Ramila Mae MD    Specific Provider Orders/Date: OT eval and treat (22)    Diagnosis: Trauma [T14.90XA]  Traumatic pneumothorax, initial encounter [S27. 0XXA]  Closed fracture of multiple ribs of left side, initial encounter [S22.42XA]    Surgeries/Procedures: None this admission       Pt admitted s/p fall over cat toys with L sided rib fractures and L pneumothorax. Pertinent Medical History:       has a past medical history of Arthritis, CAD (coronary artery disease), Carotid stenosis, Chronic back pain, Chronic obstructive pulmonary disease (COPD) ([facility] [address] Hyperlipidemia, Hypertension, Restless legs syndrome, and Right bundle branch block.          Precautions:  Fall Risk, chest tube, L rib fxs (8,9,10)     Assessment of current deficits    [x] Functional mobility  [x]ADLs  [x] Strength               [x]Cognition    [x] Functional transfers   [x] IADLs         [x] Safety Awareness   [x]Endurance    [] Fine Coordination              [x] Balance      [] Vision/perception   []Sensation     []Gross Motor Coordination  [x] ROM  [] Delirium                   [] Motor Control     OT PLAN OF CARE   OT POC based on physician orders, patient diagnosis and results of clinical assessment    Frequency/Duration 1-3 days/wk for 2 weeks PRN   Specific OT Treatment Interventions to include:   * Instruction/training on adapted ADL techniques and AE recommendations to increase functional independence within precautions       * Training on energy conservation strategies, correct breathing pattern and techniques to improve independence/tolerance for self-care routine  * Functional transfer/mobility training/DME recommendations for increased independence, safety, and fall prevention  * Patient/Family education to increase follow through with safety techniques and functional independence  * Recommendation of environmental modifications for increased safety with functional transfers/mobility and ADLs  * Sensory re-education to improve body/limb awareness, maintain/improve skin integrity, and improve hand/UE motor function  * Therapeutic exercise to improve motor endurance, ROM, and functional strength for ADLs/functional transfers  * Therapeutic activities to facilitate/challenge dynamic balance, stand tolerance for increased safety and independence with ADLs  * Therapeutic activities to facilitate gross/fine motor skills for increased independence with ADLs  * Positioning to improve skin integrity, interaction with environment and functional independence    Recommended Adaptive Equipment/DME: TBD     Home Living: Pt lives with wife in 1 story home with 0 and bed and bath on main floor. Bathroom setup: walk-in shower   Equipment owned: Grab bar in shower    Prior Level of Function: Ind with ADLs , Ind with IADLs; Used No AD for functional mobility. Driving: yes  Occupation: None stated    Pain Level: 0/10; attends pain clinic for back/shoulders  Cognition: A&O: 4/4; Follows 2 step directions   Memory: G-   Sequencing: G-   Problem solving:  G-   Judgement/safety:  G-    Vitals Assessed: NT  SpO2:      BP:      Functional Assessment:  AM-PAC Daily Activity Raw Score: 18/24   Initial Eval Status  Date: 8/6/22 Treatment Status  Date: STGs = LTGs  Time frame: 10-14 days   Feeding S; set-up    Independent    Grooming S; set-up    Independent    UB Dressing Minimal Assist   Limited by pain in L side.     Modified [place]    LB Dressing Minimal Assist   Simulated  Pt wearing pants/socks/shoes upon arrival.    Modified [place] Bathing Minimal Assist    Modified [place]    Toileting Minimal Assist   Simualted d/t patient declining task. Independent    Bed Mobility  Supine to sit: Stand by Assist   Sit to supine: Stand by Assist   Supine to sit: Independent   Sit to supine: Independent     Functional Transfers Stand by Assist   Independent    Functional Mobility Stand by Assist   For household distances  Independent  For community distances    Balance Sitting:     Static: S    Dynamic:SBA  Standing: SBA    during functional activity  Sitting:     Static:  Ind    Dynamic:Ind  Standing: Ind   Activity Tolerance G-  Pt self limiting  G   Visual/  Perceptual Glasses: Yes        Safety G-  G     Hand Dominance R   AROM (PROM) Strength Additional Info:    RUE  WFL Proximal: 4/5  Distal: 4+/5 WFL  and wfl FMC/dexterity noted during ADL tasks       LUE WFL Proximal: 3/5  Distal: 4/5 WFL  and wfl FMC/dexterity noted during ADL tasks       Hearing: WFL  Sensation:  No c/o numbness or tingling  Tone: WFL   Edema: Unremarkable    Comments: Patient cleared by nursing. Upon arrival patient supine in bed , educated on the role of OT, and agreeable to OT session with encouragement. Pt initially refusing, however agreeable with explanation of services. No family present for session today. OT facilitated ADLs, bed mobility, functional transfers with focus on safety, body mechanics, and positioning techniques to alleviate pain. At end of session, patient seated upright in bed per request , properly positioned, oriented to call light, with call light to R side and phone within reach, all lines and tubes intact. Nursing notified. Overall patient demonstrated good reception to education, decreased independence and safety during completion of ADL/functional transfer/mobility tasks.   Pt would benefit from continued skilled OT to increase safety and independence with completion of ADL/IADL tasks for functional independence and quality of life.    Treatment: OT treatment provided this date includes: NT      Rehab Potential: Good for established goals     Patient / Family Goal: to [facility]      Patient and/or family were instructed on functional diagnosis, prognosis/goals and OT plan of care. Demonstrated good understanding. Eval Complexity: Low    Time In: 7:50a  Time Out: 8:05a  Total Treatment Time: 0 min    Min Units   OT Eval Low 97165 x  1    OT Eval Medium 67566      OT Eval High 96383      OT Re-Eval [id number]       Therapeutic Ex 65193       Therapeutic Activities 48247       ADL/Self Care 50332       Orthotic Management [id number]       Manual [id number]     Neuro Re-Ed 72306       Non-Billable Time          Evaluation Time additionally includes thorough review of current medical information, gathering information on past medical history/social history and prior level of function, interpretation of standardized testing/informal observation of tasks, assessment of data and development of plan of care and goals.             FATEMEH Magallanes,  OTR/L; [id number]

## 2022-08-06 NOTE — ED PROVIDER NOTES
Chief Complaint   Patient presents with    Fall     Tripped and fell, landing on left back. Having left rib pain. 14-year-old gentleman with past medical history of COPD, CAD, hypertension, hyperlipidemia presenting today after a fall. He was talking to his wife on the phone when he tripped over a cat toy and fell directly onto his back. He is complaining of left-sided posterior lateral back pain. He was seen at [facility] earlier today and was found to have rib fractures of 8, 9, 10 with a 20 to 25% pneumothorax. No tension noted. He is experiencing moderate pain, nothing is made it better or worse, not associated with chest pain, headache, vision changes, no numbness, weakness, tingling. He does not have any other acute complaints and denies hitting his head or loss of consciousness. Review of Systems   Constitutional:  Negative for chills and fever. HENT:  Negative for ear pain, sinus pain and sore throat. Eyes:  Negative for pain and redness. Respiratory:  Positive for shortness of breath. Negative for cough and wheezing. Cardiovascular:  Negative for chest pain. Gastrointestinal:  Negative for abdominal pain, diarrhea, nausea and vomiting. Genitourinary:  Negative for dysuria and flank pain. Musculoskeletal:  Positive for back pain. Negative for neck pain. Skin:  Negative for rash. Neurological:  Negative for seizures and headaches. Hematological:  Negative for adenopathy. All other systems reviewed and are negative. Physical Exam  Vitals and nursing note reviewed. Constitutional:       General: He is not in acute distress. Appearance: He is well-developed. HENT:      Head: Normocephalic and atraumatic. Right Ear: External ear normal.      Left Ear: External ear normal.      Mouth/Throat:      Mouth: Mucous membranes are moist.      Pharynx: Oropharynx is clear. Eyes:      Pupils: Pupils are equal, round, and reactive to light.    Cardiovascular: Rate and Rhythm: Normal rate and regular rhythm. Heart sounds: Normal heart sounds. No murmur heard. Pulmonary:      Effort: Pulmonary effort is normal. No respiratory distress. Breath sounds: Normal breath sounds. No wheezing. Comments: Saturating well on nonrebreather  Abdominal:      General: Bowel sounds are normal.      Palpations: Abdomen is soft. Tenderness: There is no abdominal tenderness. There is no guarding or rebound. Musculoskeletal:         General: Tenderness present. Cervical back: Normal range of motion and neck supple. Comments: Tenderness to palpation mid lateral posterior back   Skin:     General: Skin is warm and dry. Neurological:      General: No focal deficit present. Mental Status: He is alert and oriented to person, place, and time. Cranial Nerves: No cranial nerve deficit. Motor: No weakness. Procedures   Chest Tube Placement Procedure Note    Indication: pneumothorax    Consent: The patient provided verbal consent for this procedure. Pre-Medication: fentanyl intravenously    Procedure: The patient was placed in a semirecumbent position with the head of the bed at 30 degrees. The left side was prepped with betadine and draped in a sterile fashion. Local anesthesia over the insertion site was obtained by infiltration using 1% Lidocaine without epinephrine. An incision was made laterally in the midaxillary line. Blunt dissection up and over the rib was performed until access was obtained into the pleural cavity. A 28 [place] chest tube was placed and connected to suction. Initial output from the tube was air. The tube was sutured in place and the site was covered with an occlusive dressing. All connections were banded. Breath sounds after the procedure were normal.  A chest x-ray was obtained to evaluate placement which showed good tube position, and showed complete expansion of the lung.   The tube does appear to be kinked however the lung is reinflated. The patient tolerated the procedure well. Complications: None        EKG: This EKG is signed and interpreted by me. Rate: 66  Rhythm: Sinus and with Right BBB  Interpretation: no acute changes, no ST elevations, levels normal, left axis deviation  Comparison: stable as compared to patient's most recent EKG      MDM  Number of Diagnoses or Management Options  Closed fracture of multiple ribs of left side, initial encounter  Traumatic pneumothorax, initial encounter  Diagnosis management comments: 54-year-old gentleman with past medical history of COPD, CAD, hypertension, hyperlipidemia presents after a fall. On arrival to emergency room he is hemodynamically stable, arrived from [facility] emergency department on 15 L nonrebreather. Chest x-ray redemonstrated pneumothorax, trauma surgery was consulted and they requested CT abdomen pelvis. No other abnormalities noted. Patient was symptomatically managed well in the emergency department, left-sided formal chest tube was placed with adequate placement and lung reinflation, on x-ray imaging the tube does appear to be kinked however the patient is comfortable, lung is reinflated adequately. Trauma surgery accepts the patient for admission in the surgical intensive care unit. Patient understanding of plan. Amount and/or Complexity of Data Reviewed  Clinical lab tests: reviewed  Tests in the radiology section of CPT®: reviewed         ED Course as of 08/06/22 0216   Fri Aug 05, 2022   1800 Patient presents after a fall. Imaging was obtained and did show fractures of ribs 8 9 and 10 along with a 25% pneumothorax on the left. Vitals are stable. Patient was placed on nonrebreather. CT of the head and cervical spine did not show any acute intracranial or cervical pathology. Patient will be transferred to [facility] for further evaluation and care.  [BB]   6966 General surgery requesting CT abdomen Metoprolol    -------------------------------------------------- RESULTS -------------------------------------------------    LABS:  Results for orders placed or performed during the hospital encounter of 08/05/22   CBC with Auto Differential   Result Value Ref Range    WBC 11.0 4.5 - 11.5 E9/L    RBC 4.04 3.80 - 5.80 E12/L    Hemoglobin 12.7 12.5 - 16.5 g/dL    Hematocrit 38.7 37.0 - 54.0 %    MCV 95.8 80.0 - 99.9 fL    MCH 31.4 26.0 - 35.0 pg    MCHC 32.8 32.0 - 34.5 %    RDW 12.8 11.5 - 15.0 fL    Platelets 770 736 - 742 E9/L    MPV 9.6 7.0 - 12.0 fL    Neutrophils % 53.5 43.0 - 80.0 %    Immature Granulocytes % 0.3 0.0 - 5.0 %    Lymphocytes % 24.2 20.0 - 42.0 %    Monocytes % 18.6 (H) 2.0 - 12.0 %    Eosinophils % 3.2 0.0 - 6.0 %    Basophils % 0.2 0.0 - 2.0 %    Neutrophils Absolute 5.91 1.80 - 7.30 E9/L    Immature Granulocytes # 0.03 E9/L    Lymphocytes Absolute 2.67 1.50 - 4.00 E9/L    Monocytes Absolute 2.05 (H) 0.10 - 0.95 E9/L    Eosinophils Absolute 0.35 0.05 - 0.50 E9/L    Basophils Absolute 0.02 0.00 - 0.20 E9/L    RBC Morphology Normal    Lactic Acid   Result Value Ref Range    Lactic Acid 0.6 0.5 - 2.2 mmol/L   Protime-INR   Result Value Ref Range    Protime 11.0 9.3 - 12.4 sec    INR 1.0    APTT   Result Value Ref Range    aPTT 31.5 24.5 - 35.1 sec   Comprehensive Metabolic Panel w/ Reflex to MG   Result Value Ref Range    Sodium 139 132 - 146 mmol/L    Potassium reflex Magnesium 4.3 3.5 - 5.0 mmol/L    Chloride 104 98 - 107 mmol/L    CO2 25 22 - 29 mmol/L    Anion Gap 10 7 - 16 mmol/L    Glucose 99 74 - 99 mg/dL    BUN 11 6 - 23 mg/dL    Creatinine 0.8 0.7 - 1.2 mg/dL    GFR Non-African American >60 >=60 mL/min/1.73    GFR African American >60     Calcium 8.9 8.6 - 10.2 mg/dL    Total Protein 7.3 6.4 - 8.3 g/dL    Albumin 4.3 3.5 - 5.2 g/dL    Total Bilirubin 0.3 0.0 - 1.2 mg/dL    Alkaline Phosphatase 65 40 - 129 U/L    ALT 11 0 - 40 U/L    AST 16 0 - 39 U/L       RADIOLOGY:  CT ABDOMEN PELVIS W IV CONTRAST Additional Contrast? None   Final Result   A left pneumothorax. There is atelectasis at both lung bases. Diverticulosis of the colon. XR CHEST PORTABLE   Final Result   Persistent left small to moderate pneumothorax. No evidence of tension   physiology. CT Head WO Contrast   Final Result   Mild cortical atrophy with small vessel ischemic disease. There is no acute   findings. Left maxillary sinus disease. CT cervical spine. Comparison 07/15/2011      Findings      There is no acute displaced fracture in the cervical spine. A well   corticated bony fragment is identified at the tip of the dense without   change. There is mild degenerative changes from C3-T1. The prevertebral   soft tissues are normal.  There is a pneumothorax in the left lung apex which   will be discussed in the CT of the chest.  There is calcification and   stenosis of the carotid arteries. Impression      No acute fractures. Mild degenerative changes from C3-T1. Left apical pneumothorax. CT chest.      The heart size is normal.  There is diffuse coronary artery calcification. The great vessels are normal.  Moderately enlarged mediastinal lymph nodes   measuring up to 7 mm are noted. Fractures of the left 8, 9 and 10th ribs   posterolaterally are identified. There is nearly 20-25% left inferior and   lateral pneumothorax with atelectasis/pleural effusion in the left lower   lobe. There is minimal atelectasis in the right lung base as well. A   cortical irregularity is noted in the body of the sternum which could   represent a nondisplaced sternal fracture. Liver is normal.  Gallbladder is   distended. Consider ultrasonography. There is ectatic abdominal aorta   measuring 2.4 x 2.5 cm. Consider dedicated CT of the abdomen and pelvis. 1.5 cm left renal cyst is noted.       Impression      Fractures of the left 8, 9 and 10th ribs laterally with nearly 20-25% left basilar hydro pneumothorax with atelectasis in the left base. There is also   scarring/atelectasis in the right lung base. Cortical irregularity of the sternum concerning for nondisplaced sternal   fracture. Clinical assessment is recommended. Critical results were called by Dr. Gracie Barron to or Dr. Mateus Gutierrez. On   8/5/2022 at 16:56. CT Cervical Spine WO Contrast   Final Result   Mild cortical atrophy with small vessel ischemic disease. There is no acute   findings. Left maxillary sinus disease. CT cervical spine. Comparison 07/15/2011      Findings      There is no acute displaced fracture in the cervical spine. A well   corticated bony fragment is identified at the tip of the dense without   change. There is mild degenerative changes from C3-T1. The prevertebral   soft tissues are normal.  There is a pneumothorax in the left lung apex which   will be discussed in the CT of the chest.  There is calcification and   stenosis of the carotid arteries. Impression      No acute fractures. Mild degenerative changes from C3-T1. Left apical pneumothorax. CT chest.      The heart size is normal.  There is diffuse coronary artery calcification. The great vessels are normal.  Moderately enlarged mediastinal lymph nodes   measuring up to 7 mm are noted. Fractures of the left 8, 9 and 10th ribs   posterolaterally are identified. There is nearly 20-25% left inferior and   lateral pneumothorax with atelectasis/pleural effusion in the left lower   lobe. There is minimal atelectasis in the right lung base as well. A   cortical irregularity is noted in the body of the sternum which could   represent a nondisplaced sternal fracture. Liver is normal.  Gallbladder is   distended. Consider ultrasonography. There is ectatic abdominal aorta   measuring 2.4 x 2.5 cm. Consider dedicated CT of the abdomen and pelvis. 1.5 cm left renal cyst is noted.       Impression Fractures of the left 8, 9 and 10th ribs laterally with nearly 20-25% left   basilar hydro pneumothorax with atelectasis in the left base. There is also   scarring/atelectasis in the right lung base. Cortical irregularity of the sternum concerning for nondisplaced sternal   fracture. Clinical assessment is recommended. Critical results were called by Dr. Brady Kerr to or Dr. Jefry Chowdary. On   8/5/2022 at 16:56. CT CHEST WO CONTRAST   Final Result   Mild cortical atrophy with small vessel ischemic disease. There is no acute   findings. Left maxillary sinus disease. CT cervical spine. Comparison 07/15/2011      Findings      There is no acute displaced fracture in the cervical spine. A well   corticated bony fragment is identified at the tip of the dense without   change. There is mild degenerative changes from C3-T1. The prevertebral   soft tissues are normal.  There is a pneumothorax in the left lung apex which   will be discussed in the CT of the chest.  There is calcification and   stenosis of the carotid arteries. Impression      No acute fractures. Mild degenerative changes from C3-T1. Left apical pneumothorax. CT chest.      The heart size is normal.  There is diffuse coronary artery calcification. The great vessels are normal.  Moderately enlarged mediastinal lymph nodes   measuring up to 7 mm are noted. Fractures of the left 8, 9 and 10th ribs   posterolaterally are identified. There is nearly 20-25% left inferior and   lateral pneumothorax with atelectasis/pleural effusion in the left lower   lobe. There is minimal atelectasis in the right lung base as well. A   cortical irregularity is noted in the body of the sternum which could   represent a nondisplaced sternal fracture. Liver is normal.  Gallbladder is   distended. Consider ultrasonography. There is ectatic abdominal aorta   measuring 2.4 x 2.5 cm.   Consider dedicated CT of the abdomen and pelvis. 1.5 cm left renal cyst is noted. Impression      Fractures of the left 8, 9 and 10th ribs laterally with nearly 20-25% left   basilar hydro pneumothorax with atelectasis in the left base. There is also   scarring/atelectasis in the right lung base. Cortical irregularity of the sternum concerning for nondisplaced sternal   fracture. Clinical assessment is recommended. Critical results were called by Dr. Tiago Jiménez to or Dr. Tiffany Stokes. On   8/5/2022 at 16:56. XR CHEST PORTABLE    (Results Pending)   XR CHEST PORTABLE    (Results Pending)   XR CHEST PORTABLE    (Results Pending)     ------------------------- NURSING NOTES AND VITALS REVIEWED ---------------------------  Date / Time Roomed:  8/5/2022  3:26 PM  ED Bed Assignment:  0620/0344-T    The nursing notes within the ED encounter and vital signs as below have been reviewed. Patient Vitals for the past 24 hrs:   BP Temp Pulse Resp SpO2 Height Weight   08/05/22 1941 135/73 98.4 °F (36.9 °C) 72 16 98 % -- --   08/05/22 1531 -- -- -- -- -- 5' 11\" (1.803 m) 160 lb (72.6 kg)   08/05/22 1511 (!) 149/69 98.4 °F (36.9 °C) 73 16 94 % -- --       Oxygen Saturation Interpretation: Normal    ------------------------------------------ PROGRESS NOTES ------------------------------------------  Re-evaluation(s):  Time: 0205  Patients symptoms are improving  Repeat physical examination is improved    Counseling:  I have spoken with the patient and discussed todays results, in addition to providing specific details for the plan of care and counseling regarding the diagnosis and prognosis. Their questions are answered at this time and they are agreeable with the plan of admission.    --------------------------------- ADDITIONAL PROVIDER NOTES ---------------------------------  Consultations:  Time: 2100. Spoke with Dr. Jackson Marcum admitting under Dr. Ester Andrade. Discussed case. They will admit the patient.   This patient's ED course included: a personal history and physicial examination, re-evaluation prior to disposition, multiple bedside re-evaluations, IV medications, cardiac monitoring, continuous pulse oximetry, and complex medical decision making and emergency management    This patient has remained hemodynamically stable during their ED course. Diagnosis:  1. Traumatic pneumothorax, initial encounter    2. Closed fracture of multiple ribs of left side, initial encounter        Disposition:  Patient's disposition: Admit to SICU  Patient's condition is stable.            Farnaz Tilley DO  Resident  08/06/22 6671

## 2022-08-06 NOTE — PROGRESS NOTES
Physical Therapy  Physical Therapy Initial Assessment     Name: Justin Del Rosario  : 1955  MRN: [id number]      Date of Service: 2022    Evaluating PT:  [place], PT, GLYNN [id number]    Room #:  4907/7790-K  Diagnosis:  Trauma [T14.90XA]  Traumatic pneumothorax, initial encounter [S27. 0XXA]  Closed fracture of multiple ribs of left side, initial encounter [S22.42XA]  PMHx/PSHx:    Past Medical History:   Diagnosis Date    Arthritis     CAD (coronary artery disease)     Carotid stenosis     Chronic back pain     Chronic obstructive pulmonary disease (COPD) ([facility] [address] 10/10/2019    Hyperlipidemia     Hypertension     Restless legs syndrome     Right bundle branch block 2011    New RBBB on EKG. Procedure/Surgery:  None  Precautions:  Falls, Chest tube, O2  Equipment Needs:  None    SUBJECTIVE:    Pt lives with wife in a 1 story home with level entry. Pt ambulated without device and was independent PTA. OBJECTIVE:   Initial Evaluation  Date: 22 Treatment Short Term/ Long Term   Goals   AM-PAC 6 Clicks 89/82     Was pt agreeable to Eval/treatment? Yes     Does pt have pain?  10/10 chest tube site pain  -unable to received medication at this time     Bed Mobility  Rolling: NT  Supine to sit: SBA with HOB elevated  Sit to supine: SBA  Scooting: SBA  Mod Independent   Transfers Sit to stand: SBA  Stand to sit: SBA  Stand pivot: SBA no device  Independent   Ambulation   30 feet with SBA no device  >400 feet Independently   Stair negotiation: ascended and descended NT  >4 steps with 1 rail Mod Independent   ROM BUE:  WFL  BLE:  WFL     Strength BUE:  WFL  BLE:  4/5 grossly  Increase by 1/3 MMT grade   Balance Sitting EOB:  Independent  Dynamic Standing:  SBA no device  Sitting EOB:  -  Dynamic Standing:  Independent     Pt is A & O x 4  Sensation:  No reported paresthesias  Edema:  None    Therapeutic Exercises:  NA    Patient education  Pt educated on safety    Patient response to education:   Pt verbalized understanding Pt demonstrated skill Pt requires further education in this area   x x x     ASSESSMENT:    Conditions Requiring Skilled Therapeutic Intervention:    []Decreased strength     []Decreased ROM  [x]Decreased functional mobility  [x]Decreased balance   [x]Decreased endurance   []Decreased posture  []Decreased sensation  []Decreased coordination   []Decreased vision  []Decreased safety awareness   [x]Increased pain       Comments:  Pt was in bed upon arrival, agreeable to initial evaluation with encouragement. Pt relied heavily on bed rail to assist with bed mobility. Pt stood with flexed posture, decreased gait speed and guarded posture. Pt deferred further activity and appeared somewhat self-limiting. Pt was left in bed, per request, with all needs met and call light in reach. Treatment:  Patient practiced and was instructed in the following treatment:    Bed mobility training - pt given verbal cues to facilitate proper sequencing and safety during supine>sit. Sitting EOB for >3 minutes for upright tolerance, postural awareness and BLE ROM  Transfer training - pt was given verbal cues to facilitate proper hand placement, technique and safety during sit to stand, stand to sit and stand pivot transfers. Gait training- pt was given verbal cues to facilitate safety during ambulation. Pt's/ family goals   1. Return home    Prognosis is good for reaching above PT goals. Patient and or family understand(s) diagnosis, prognosis, and plan of care. yes    PHYSICAL THERAPY PLAN OF CARE:    PT POC is established based on physician order and patient diagnosis     Referring provider/PT Order:  Drake Esteban MD /08/06/22 0030 PT eval and treat  Diagnosis:  Trauma [T14.90XA]  Traumatic pneumothorax, initial encounter [S27. 0XXA]  Closed fracture of multiple ribs of left side, initial encounter [S22.42XA]  Specific instructions for next treatment:  Progress activity    Current Treatment Recommendations:     [] Strengthening to improve independence with functional mobility   [] ROM to improve independence with functional mobility   [x] Balance Training to improve static/dynamic balance and to reduce fall risk  [x] Endurance Training to improve activity tolerance during functional mobility   [x] Transfer Training to improve safety and independence with all functional transfers   [x] Gait Training to improve gait mechanics, endurance and asses need for appropriate assistive device  [x] Stair Training in preparation for safe discharge home and/or into the community   [] Positioning to prevent skin breakdown and contractures  [x] Safety and Education Training   [x] Patient/Caregiver Education   [] HEP  [] Other     PT long term treatment goals are located in above grid    Frequency of treatments: 2-5x/week x 1-2 weeks. Time in  0753  Time out  0805    Total Treatment Time  - minutes     Evaluation Time includes thorough review of current medical information, gathering information on past medical history/social history and prior level of function, completion of standardized testing/informal observation of tasks, assessment of data and education on plan of care and goals.     CPT codes:  [x] Low Complexity PT evaluation 97840  [] Moderate Complexity PT evaluation 86558  [] High Complexity PT evaluation 00807  [] PT Re-evaluation 13971  [] Gait training 70614 - minutes  [] Manual therapy 28368 - minutes  [] Therapeutic activities 40361 - minutes  [] Therapeutic exercises 50247 - minutes  [] Neuromuscular reeducation 03556 - minutes     Annia Patterson, PT, DPT  [id number]

## 2022-08-06 NOTE — ED NOTES
PT states he is seeen in out patient pain clinic for chronic back pain, takes 15mg morphine 3 times a da along with norco for pain control     Lakesha Sanchez RN  08/05/22 212

## 2022-08-06 NOTE — PROGRESS NOTES
Santos served Dr Didier Berrios about patients chest tube site leaking again after dressing being changed an hour and half ago, wanted someone to come up and look at it and assess it, she stated that she would come up. Gaviota Caballero RN.

## 2022-08-06 NOTE — PROGRESS NOTES
Trauma Tertiary Survey    Admit Date: 8/5/20223    Hospital day 1    CC:  Fall       Past Medical History:   Diagnosis Date    Arthritis     CAD (coronary artery disease)     Carotid stenosis     Chronic back pain     Chronic obstructive pulmonary disease (COPD) ([facility] 10/10/2019    Hyperlipidemia     Hypertension     Restless legs syndrome     Right bundle branch block 6/2011    New RBBB on EKG. Alcohol pre-screening:  Men: How many times in the past year have you had 5 or more drinks in a day?  none    How much do you drink on a daily basis? none    Scheduled Meds:   sodium chloride flush  10 mL IntraVENous 2 times per day    polyethylene glycol  17 g Oral Daily    methocarbamol  1,000 mg Oral 4x Daily    gabapentin  100 mg Oral Q8H    bisacodyl  5 mg Oral Daily    amLODIPine  10 mg Oral Daily    atorvastatin  40 mg Oral Daily    morphine  15 mg Oral TID    enoxaparin  30 mg SubCUTAneous BID    lidocaine  5 mL IntraDERmal Once     Continuous Infusions:   sodium chloride       PRN Meds:sodium chloride flush, sodium chloride, ondansetron **OR** ondansetron, HYDROcodone-acetaminophen, hydrALAZINE    Subjective:     Patient reports discomfort at chest tube site. He reports no new pain. Objective:   Patient Vitals for the past 8 hrs:   BP Temp Temp src Pulse Resp SpO2   08/06/22 0416 -- -- -- -- 17 --   08/06/22 0230 136/60 98.6 °F (37 °C) Temporal 70 16 98 %       Past Medical History:   Diagnosis Date    Arthritis     CAD (coronary artery disease)     Carotid stenosis     Chronic back pain     Chronic obstructive pulmonary disease (COPD) ([facility] 10/10/2019    Hyperlipidemia     Hypertension     Restless legs syndrome     Right bundle branch block 6/2011    New RBBB on EKG. Radiology:  XR CHEST PORTABLE   Final Result   Interval placement of left-sided chest tube with improvement of left   pneumothorax. CT ABDOMEN PELVIS W IV CONTRAST Additional Contrast? None   Final Result   A left pneumothorax. scarring/atelectasis in the right lung base. Cortical irregularity of the sternum concerning for nondisplaced sternal   fracture. Clinical assessment is recommended. Critical results were called by Dr. Tiago Jiménez to or Dr. Tiffany Stokes. On   8/5/2022 at 16:56. CT Cervical Spine WO Contrast   Final Result   Mild cortical atrophy with small vessel ischemic disease. There is no acute   findings. Left maxillary sinus disease. CT cervical spine. Comparison 07/15/2011      Findings      There is no acute displaced fracture in the cervical spine. A well   corticated bony fragment is identified at the tip of the dense without   change. There is mild degenerative changes from C3-T1. The prevertebral   soft tissues are normal.  There is a pneumothorax in the left lung apex which   will be discussed in the CT of the chest.  There is calcification and   stenosis of the carotid arteries. Impression      No acute fractures. Mild degenerative changes from C3-T1. Left apical pneumothorax. CT chest.      The heart size is normal.  There is diffuse coronary artery calcification. The great vessels are normal.  Moderately enlarged mediastinal lymph nodes   measuring up to 7 mm are noted. Fractures of the left 8, 9 and 10th ribs   posterolaterally are identified. There is nearly 20-25% left inferior and   lateral pneumothorax with atelectasis/pleural effusion in the left lower   lobe. There is minimal atelectasis in the right lung base as well. A   cortical irregularity is noted in the body of the sternum which could   represent a nondisplaced sternal fracture. Liver is normal.  Gallbladder is   distended. Consider ultrasonography. There is ectatic abdominal aorta   measuring 2.4 x 2.5 cm. Consider dedicated CT of the abdomen and pelvis. 1.5 cm left renal cyst is noted.       Impression      Fractures of the left 8, 9 and 10th ribs laterally with nearly 20-25% left   basilar hydro pneumothorax with atelectasis in the left base. There is also   scarring/atelectasis in the right lung base. Cortical irregularity of the sternum concerning for nondisplaced sternal   fracture. Clinical assessment is recommended. Critical results were called by Dr. Shiraz Peterson to or Dr. Reece Lambert. On   8/5/2022 at 16:56. CT CHEST WO CONTRAST   Final Result   Mild cortical atrophy with small vessel ischemic disease. There is no acute   findings. Left maxillary sinus disease. CT cervical spine. Comparison 07/15/2011      Findings      There is no acute displaced fracture in the cervical spine. A well   corticated bony fragment is identified at the tip of the dense without   change. There is mild degenerative changes from C3-T1. The prevertebral   soft tissues are normal.  There is a pneumothorax in the left lung apex which   will be discussed in the CT of the chest.  There is calcification and   stenosis of the carotid arteries. Impression      No acute fractures. Mild degenerative changes from C3-T1. Left apical pneumothorax. CT chest.      The heart size is normal.  There is diffuse coronary artery calcification. The great vessels are normal.  Moderately enlarged mediastinal lymph nodes   measuring up to 7 mm are noted. Fractures of the left 8, 9 and 10th ribs   posterolaterally are identified. There is nearly 20-25% left inferior and   lateral pneumothorax with atelectasis/pleural effusion in the left lower   lobe. There is minimal atelectasis in the right lung base as well. A   cortical irregularity is noted in the body of the sternum which could   represent a nondisplaced sternal fracture. Liver is normal.  Gallbladder is   distended. Consider ultrasonography. There is ectatic abdominal aorta   measuring 2.4 x 2.5 cm. Consider dedicated CT of the abdomen and pelvis. 1.5 cm left renal cyst is noted.       Impression Fractures of the left 8, 9 and 10th ribs laterally with nearly 20-25% left   basilar hydro pneumothorax with atelectasis in the left base. There is also   scarring/atelectasis in the right lung base. Cortical irregularity of the sternum concerning for nondisplaced sternal   fracture. Clinical assessment is recommended. Critical results were called by Dr. Tiago Jiménez to or Dr. Tiffany Stokes. On   8/5/2022 at 16:56. XR CHEST PORTABLE    (Results Pending)   XR CHEST PORTABLE    (Results Pending)       PHYSICAL EXAM:   GCS:    4 - Opens eyes on own   6 - Follows simple motor commands  5 - Alert and oriented      Pupil size:  Left 4 mm Right 4 mm  Pupil reaction: Yes  Wiggles fingers: Left yes Right yes  Hand grasp:   Left yes  Right yes  Wiggles toes: Left yes   Right yes  Plantar flexion: Left yes Right yes    PHYSICAL EXAM   General: No apparent distress, comfortable  HEENT: Trachea midline, no masses, Pupils equal round  Chest: Respiratory effort was normal with no retractions or use of accessory muscles. 2 L NC SMI:1000. L chest tube in place without air leak. 10 cc sanguinous output bs clear b/l  Cardiovascular: Extremities warm, well perfused no extra heart sounds   Abdomen:  Soft and non distended.   No tenderness, guarding, rebound, or rigidity  Extremities: Moves all 4 extremities, No pedal edema    Spine:     Spine Tenderness ROM   Cervical 0 /10 Normal   Thoracic 5 /10 Normal   Lumbar 5 /10 Normal     Musculoskeletal    Joint Tenderness Swelling ROM   Right shoulder absent absent normal   Left shoulder absent absent normal   Right elbow absent absent normal   Left elbow absent absent normal   Right wrist absent absent normal   Left wrist absent absent normal   Right hand grasp absent absent normal   Left hand grasp absent absent normal   Right hip absent absent normal   Left hip absent absent normal   Right knee absent absent normal   Left knee absent absent normal   Right ankle absent absent normal   Left ankle absent absent normal   Right foot absent absent normal   Left foot absent absent normal       CONSULTS: None    PROCEDURES: chest tube insertion    INJURIES:        Principal Problem:    Trauma  Active Problems:    Pneumothorax  Resolved Problems:    * No resolved hospital problems. *        Assessment/Plan:       Neuro:   Continue to monitor neuro status. Continue home pain medications  CV: Monitor hemodynamics. Continue home BP meds. Pulm: Monitor RR and SpO2, pulmonary hygiene, SMI 1000. CT to suction, check CXR if ok d/c tube today,   GI:  Diet, monitor bowel function  Renal: No acute issues. Monitor BUN/Cr/UOP  ID:  No acute issues  Endocrine: Maintain blood glucose < 180  MSK: PT/OT when able  Heme: Daily CBC    Bowel regime: Glycolax, MOM   Pain control/Sedation: Norco, MS Contin  DVT prophylaxis: SCD's, Lovenox    GI: diet  Mouth/Eye care: Per patient  Ruiz: none    Code status:    Full Code  Patient/Family update:  As available    Disposition:  Admitted to general floor. Electronically signed by Shaista White MD on 8/6/22 at 4:03 AM EDT      Attending Attestation   Patient seen and examined, agree with resident note except for changes made by me, for remaining HP/Consult/progress note details please see resident HP/Consult/progress note.       Leroy Butterfield MD

## 2022-08-07 VITALS
HEART RATE: 87 BPM | RESPIRATION RATE: 16 BRPM | HEIGHT: 71 IN | WEIGHT: 160 LBS | SYSTOLIC BLOOD PRESSURE: 137 MMHG | BODY MASS INDEX: 22.4 KG/M2 | DIASTOLIC BLOOD PRESSURE: 75 MMHG | TEMPERATURE: 98.8 F | OXYGEN SATURATION: 93 %

## 2022-08-07 LAB
ANION GAP SERPL CALCULATED.3IONS-SCNC: 8 MMOL/L (ref 7–16)
BASOPHILS ABSOLUTE: 0.01 E9/L (ref 0–0.2)
BASOPHILS RELATIVE PERCENT: 0.1 % (ref 0–2)
BUN BLDV-MCNC: 11 MG/DL (ref 6–23)
CALCIUM SERPL-MCNC: 8.6 MG/DL (ref 8.6–10.2)
CHLORIDE BLD-SCNC: 98 MMOL/L (ref 98–107)
CO2: 28 MMOL/L (ref 22–29)
CREAT SERPL-MCNC: 0.9 MG/DL (ref 0.7–1.2)
EOSINOPHILS ABSOLUTE: 0.3 E9/L (ref 0.05–0.5)
EOSINOPHILS RELATIVE PERCENT: 3.1 % (ref 0–6)
GFR AFRICAN AMERICAN: >60
GFR NON-AFRICAN AMERICAN: >60 ML/MIN/1.73
GLUCOSE BLD-MCNC: 102 MG/DL (ref 74–99)
HCT VFR BLD CALC: 34.4 % (ref 37–54)
HEMOGLOBIN: 11.6 G/DL (ref 12.5–16.5)
IMMATURE GRANULOCYTES #: 0.03 E9/L
IMMATURE GRANULOCYTES %: 0.3 % (ref 0–5)
LYMPHOCYTES ABSOLUTE: 2.09 E9/L (ref 1.5–4)
LYMPHOCYTES RELATIVE PERCENT: 21.7 % (ref 20–42)
MCH RBC QN AUTO: 32.7 PG (ref 26–35)
MCHC RBC AUTO-ENTMCNC: 33.7 % (ref 32–34.5)
MCV RBC AUTO: 96.9 FL (ref 80–99.9)
MONOCYTES ABSOLUTE: 1.95 E9/L (ref 0.1–0.95)
MONOCYTES RELATIVE PERCENT: 20.3 % (ref 2–12)
NEUTROPHILS ABSOLUTE: 5.24 E9/L (ref 1.8–7.3)
NEUTROPHILS RELATIVE PERCENT: 54.5 % (ref 43–80)
PDW BLD-RTO: 12.5 FL (ref 11.5–15)
PLATELET # BLD: 96 E9/L (ref 130–450)
PLATELET CONFIRMATION: NORMAL
PMV BLD AUTO: 10 FL (ref 7–12)
POTASSIUM REFLEX MAGNESIUM: 4 MMOL/L (ref 3.5–5)
RBC # BLD: 3.55 E12/L (ref 3.8–5.8)
RBC # BLD: NORMAL 10*6/UL
SODIUM BLD-SCNC: 134 MMOL/L (ref 132–146)
WBC # BLD: 9.6 E9/L (ref 4.5–11.5)

## 2022-08-07 PROCEDURE — 36415 COLL VENOUS BLD VENIPUNCTURE: CPT

## 2022-08-07 PROCEDURE — 6370000000 HC RX 637 (ALT 250 FOR IP): Performed by: STUDENT IN AN ORGANIZED HEALTH CARE EDUCATION/TRAINING PROGRAM

## 2022-08-07 PROCEDURE — 99232 SBSQ HOSP IP/OBS MODERATE 35: CPT | Performed by: SURGERY

## 2022-08-07 PROCEDURE — 6360000002 HC RX W HCPCS: Performed by: STUDENT IN AN ORGANIZED HEALTH CARE EDUCATION/TRAINING PROGRAM

## 2022-08-07 PROCEDURE — 80048 BASIC METABOLIC PNL TOTAL CA: CPT

## 2022-08-07 PROCEDURE — 2580000003 HC RX 258: Performed by: STUDENT IN AN ORGANIZED HEALTH CARE EDUCATION/TRAINING PROGRAM

## 2022-08-07 PROCEDURE — 85025 COMPLETE CBC W/AUTO DIFF WBC: CPT

## 2022-08-07 RX ORDER — METHOCARBAMOL 500 MG/1
1000 TABLET, FILM COATED ORAL 4 TIMES DAILY
Qty: 80 TABLET | Refills: 0 | Status: SHIPPED | OUTPATIENT
Start: 2022-08-07 | End: 2022-08-16 | Stop reason: SDUPTHER

## 2022-08-07 RX ORDER — GABAPENTIN 100 MG/1
100 CAPSULE ORAL EVERY 8 HOURS
Qty: 90 CAPSULE | Refills: 3 | Status: SHIPPED | OUTPATIENT
Start: 2022-08-07 | End: 2022-09-06

## 2022-08-07 RX ADMIN — POLYETHYLENE GLYCOL 3350 17 G: 17 POWDER, FOR SOLUTION ORAL at 07:37

## 2022-08-07 RX ADMIN — GABAPENTIN 100 MG: 100 CAPSULE ORAL at 00:07

## 2022-08-07 RX ADMIN — METHOCARBAMOL 1000 MG: 500 TABLET ORAL at 07:37

## 2022-08-07 RX ADMIN — ATORVASTATIN CALCIUM 40 MG: 40 TABLET, FILM COATED ORAL at 07:37

## 2022-08-07 RX ADMIN — BISACODYL 5 MG: 5 TABLET, COATED ORAL at 07:37

## 2022-08-07 RX ADMIN — AMLODIPINE BESYLATE 10 MG: 5 TABLET ORAL at 07:38

## 2022-08-07 RX ADMIN — ENOXAPARIN SODIUM 30 MG: 100 INJECTION SUBCUTANEOUS at 07:37

## 2022-08-07 RX ADMIN — HYDROCODONE BITARTRATE AND ACETAMINOPHEN 1 TABLET: 10; 325 TABLET ORAL at 07:38

## 2022-08-07 RX ADMIN — MORPHINE SULFATE 15 MG: 15 TABLET, FILM COATED, EXTENDED RELEASE ORAL at 07:38

## 2022-08-07 RX ADMIN — MORPHINE SULFATE 15 MG: 15 TABLET, FILM COATED, EXTENDED RELEASE ORAL at 13:30

## 2022-08-07 RX ADMIN — GABAPENTIN 100 MG: 100 CAPSULE ORAL at 07:38

## 2022-08-07 RX ADMIN — METHOCARBAMOL 1000 MG: 500 TABLET ORAL at 13:29

## 2022-08-07 RX ADMIN — SODIUM CHLORIDE, PRESERVATIVE FREE 10 ML: 5 INJECTION INTRAVENOUS at 07:37

## 2022-08-07 ASSESSMENT — PAIN DESCRIPTION - DESCRIPTORS
DESCRIPTORS: ACHING;SORE
DESCRIPTORS: ACHING

## 2022-08-07 ASSESSMENT — PAIN DESCRIPTION - LOCATION
LOCATION: BACK
LOCATION: CHEST

## 2022-08-07 ASSESSMENT — PAIN DESCRIPTION - ORIENTATION
ORIENTATION: LEFT
ORIENTATION: LOWER;LEFT

## 2022-08-07 ASSESSMENT — PAIN SCALES - GENERAL
PAINLEVEL_OUTOF10: 6
PAINLEVEL_OUTOF10: 7
PAINLEVEL_OUTOF10: 7

## 2022-08-07 ASSESSMENT — PAIN DESCRIPTION - FREQUENCY: FREQUENCY: CONTINUOUS

## 2022-08-07 ASSESSMENT — PAIN DESCRIPTION - PAIN TYPE: TYPE: ACUTE PAIN

## 2022-08-07 ASSESSMENT — PAIN DESCRIPTION - ONSET: ONSET: ON-GOING

## 2022-08-07 NOTE — PLAN OF CARE
Problem: Discharge Planning  Goal: Discharge to home or other facility with appropriate resources  8/7/2022 1454 by Kevin Cooks, RN  Outcome: Completed  8/7/2022 1018 by Kevin Cooks, RN  Outcome: Progressing     Problem: Pain  Goal: Verbalizes/displays adequate comfort level or baseline comfort level  8/7/2022 1454 by Kevin Cooks, RN  Outcome: Completed  8/7/2022 1018 by Kevin Cooks, RN  Outcome: Progressing     Problem: Safety - Adult  Goal: Free from fall injury  8/7/2022 1454 by Kevin Cooks, RN  Outcome: Completed  Flowsheets (Taken 8/7/2022 1022)  Free From Fall Injury: Instruct family/caregiver on patient safety  8/7/2022 1018 by Kevin Cooks, RN  Outcome: Progressing

## 2022-08-07 NOTE — PROGRESS NOTES
TRAUMA SURGERY  ATTENDING PROGRESS NOTE      CC: fall    S:   Doing well better with CT out. O:   @BP (!) 112/55   Pulse 60   Temp 98.1 °F (36.7 °C) (Oral)   Resp 18   Ht 5' 11\" (1.803 m)   Wt 160 lb (72.6 kg)   SpO2 93%   BMI 22.32 kg/m² @    Gen - no apparent distress   Neuro - Awake, alert, attentive    HEENT - PERRL 3mm conj pink   Lungs - non labored, BS clear b/l    Heart - RR no extra heart sounds    Abdomen - snt  Ext- ROM wnl nvi    A/P: s/p fall with PTX  and rib fx     Principal Problem:    Trauma  Active Problems:    Pneumothorax  Resolved Problems:    * No resolved hospital problems. *      PTX resovled CT removed,   Rib fx smi deep breathing pain control   \  Home meds  PTOT    Discharge.       DVT prophylaxis: SCDs, Lovenox  See d/c summary     [place]  MD FACS

## 2022-08-07 NOTE — PROGRESS NOTES
Discharge paperwork reviewed with patient at bedside. Explained importance of f/u appts and new medication compliance. Patient voiced understanding.

## 2022-08-08 LAB
EKG ATRIAL RATE: 66 BPM
EKG P AXIS: 60 DEGREES
EKG P-R INTERVAL: 160 MS
EKG Q-T INTERVAL: 458 MS
EKG QRS DURATION: 138 MS
EKG QTC CALCULATION (BAZETT): 480 MS
EKG R AXIS: -72 DEGREES
EKG T AXIS: 46 DEGREES
EKG VENTRICULAR RATE: 66 BPM

## 2022-08-09 NOTE — PROGRESS NOTES
PCP is Katelyn Ahmadi MD  Office notified of admission.       Electronically signed by Jw Garcia RN MSN APRN-NP [facility] NP  [facility] CCRN 8/9/2022 1:35 PM

## 2022-08-16 ENCOUNTER — OFFICE VISIT (OUTPATIENT)
Dept: SURGERY | Age: 67
End: 2022-08-16
Payer: COMMERCIAL

## 2022-08-16 VITALS
BODY MASS INDEX: 22.68 KG/M2 | HEART RATE: 68 BPM | RESPIRATION RATE: 16 BRPM | DIASTOLIC BLOOD PRESSURE: 67 MMHG | OXYGEN SATURATION: 98 % | SYSTOLIC BLOOD PRESSURE: 159 MMHG | HEIGHT: 71 IN | WEIGHT: 162 LBS

## 2022-08-16 DIAGNOSIS — S27.0XXD TRAUMATIC PNEUMOTHORAX, SUBSEQUENT ENCOUNTER: Primary | ICD-10-CM

## 2022-08-16 PROCEDURE — G8420 CALC BMI NORM PARAMETERS: HCPCS | Performed by: NURSE PRACTITIONER

## 2022-08-16 PROCEDURE — G8427 DOCREV CUR MEDS BY ELIG CLIN: HCPCS | Performed by: NURSE PRACTITIONER

## 2022-08-16 PROCEDURE — 1123F ACP DISCUSS/DSCN MKR DOCD: CPT | Performed by: NURSE PRACTITIONER

## 2022-08-16 PROCEDURE — 99214 OFFICE O/P EST MOD 30 MIN: CPT | Performed by: NURSE PRACTITIONER

## 2022-08-16 PROCEDURE — 3017F COLORECTAL CA SCREEN DOC REV: CPT | Performed by: NURSE PRACTITIONER

## 2022-08-16 PROCEDURE — 1036F TOBACCO NON-USER: CPT | Performed by: NURSE PRACTITIONER

## 2022-08-16 PROCEDURE — 1111F DSCHRG MED/CURRENT MED MERGE: CPT | Performed by: NURSE PRACTITIONER

## 2022-08-16 RX ORDER — HYDROCODONE BITARTRATE AND ACETAMINOPHEN 10; 325 MG/1; MG/1
1 TABLET ORAL
COMMUNITY
Start: 2021-03-18

## 2022-08-16 RX ORDER — METHOCARBAMOL 500 MG/1
1000 TABLET, FILM COATED ORAL 4 TIMES DAILY
Qty: 80 TABLET | Refills: 1 | Status: SHIPPED | OUTPATIENT
Start: 2022-08-16 | End: 2022-09-05

## 2022-08-16 RX ORDER — MORPHINE SULFATE 15 MG/1
1 TABLET, FILM COATED, EXTENDED RELEASE ORAL
COMMUNITY
Start: 2021-03-18

## 2022-08-16 RX ORDER — ASPIRIN 81 MG/1
TABLET, COATED ORAL
COMMUNITY
Start: 2022-06-07

## 2022-08-16 RX ORDER — LISINOPRIL 5 MG/1
1 TABLET ORAL
COMMUNITY
Start: 2021-03-18 | End: 2022-08-16

## 2022-08-16 NOTE — PROGRESS NOTES
Take 1 tablet by mouth in the morning. 8/8/22  Yes Azar Toure,    meclizine (ANTIVERT) 25 MG tablet Take 25 mg by mouth 3 times daily as needed   Yes Historical Provider, MD   lisinopril (PRINIVIL;ZESTRIL) 5 MG tablet Take 1 tablet by mouth 2 times daily 10/15/20  Yes Valerie Soriano MD   atorvastatin (LIPITOR) 40 MG tablet Take 1 tablet by mouth daily 9/25/17  Yes Valerie Soriano MD   amLODIPine (NORVASC) 10 MG tablet Take 10 mg by mouth daily   Yes Historical Provider, MD   Omega-3 Fatty Acids (FISH OIL) 1200 MG CAPS Take by mouth 2 times daily   Yes Historical Provider, MD   HYDROcodone-acetaminophen (NORCO)  MG per tablet Take 1 tablet by mouth every 6 hours as needed. 3/9/15  Yes Historical Provider, MD   morphine sulfate ER (MS CONTIN) 15 MG CR tablet   Take 15 mg by mouth 3 times daily  6/2/15  Yes Historical Provider, MD   Multiple Vitamin (THERA) TABS   Take by mouth daily    Yes Historical Provider, MD   ASPIRIN LOW DOSE 81 MG EC tablet  6/7/22   Historical Provider, MD          CC:  Trauma follow up    Patient presents to the clinic today for follow up on fractured ribs requiring CT secondary to fall. States that things are going well, pain has been manageable, sleeping and eating without difficulty. Denies any SOB, fever or chills. Rib Fractures/Sternal fracture:    SOB:  Denies  Productive Cough No  Location of pain: left chest wall  SMI:  Threw it away  Pain: tolerable       Physical Exam   Physical Exam  Vitals reviewed. HENT:      Head: Normocephalic. Pulmonary:      Effort: Pulmonary effort is normal. No respiratory distress. Breath sounds: Normal breath sounds. No stridor. No wheezing, rhonchi or rales. Chest:      Chest wall: No tenderness. Comments: Left chest wall/flack with large healing ecchymosis, mild edema around CT site, sutures intact  Abdominal:      Palpations: Abdomen is soft. Musculoskeletal:         General: Normal range of motion.    Skin: General: Skin is warm and dry. Neurological:      Mental Status: He is alert and oriented to person, place, and time. Education  Instructed on local wound care:  Wash area with soap & water. Instructed to avoid placing antibiotic ointment on non-injured skin. Instructed to continue cough and deep breath    Instructed to increase activity. Instructed on bleeding control       Anxiety no   Depression no    Nightmares no  Inability of Difficulty to leave the Home no    Startled by Loud Noises no      Assessment  Patient Active Problem List   Diagnosis Code    Coronary artery disease involving native coronary artery of native heart without angina pectoris I25.10    Hypertension I10    Hyperlipidemia E78.5    S/P CABG x 3 Z95.1    Right bundle branch block I45.10    Carotid artery stenosis I65.29    Chronic obstructive pulmonary disease (COPD) ([facility] J44.9    Trauma T14.90XA    Pneumothorax J809     79year old male here for follow up on multiple rib fx and Ct suture removal.  CT sutures removed, moderated amount of bleeding from incision noted, this appeared to be old blood that pocked around site. Blood expressed and old surface clot removed, bleeding stopped, area was cleansed and dressed. Did discussed about not driving yet, he is currently in chronic pain management, but we will refill his muscle relaxer's. Pt prefers not to follow up and will call if anything worsens. Repeat wound check at end of visit did not demonstrate any further bleeding     Plan  RTC PRN      No future appointments.

## 2022-10-18 NOTE — PROGRESS NOTES
Left chest tube removed at beside after maximal inspiration with breath held. Bleeding from skin around site with small subcutaneous hematoma. No evidence of infection. No audible air leak. No complications. Pt stable without any changes in vital signs. Occlusive dressing placed.   CXR ordered for 4hr after removal.    Toño Horne MD  Resident, PGY-2  8/6/2022  4:31 PM N/A

## 2022-12-12 RX ORDER — GABAPENTIN 100 MG/1
CAPSULE ORAL
Qty: 90 CAPSULE | Refills: 3 | OUTPATIENT
Start: 2022-12-12

## 2025-08-08 ENCOUNTER — OFFICE VISIT (OUTPATIENT)
Dept: CARDIOLOGY CLINIC | Age: 70
End: 2025-08-08
Payer: COMMERCIAL

## 2025-08-08 VITALS
DIASTOLIC BLOOD PRESSURE: 70 MMHG | HEIGHT: 71 IN | RESPIRATION RATE: 16 BRPM | BODY MASS INDEX: 20.58 KG/M2 | HEART RATE: 57 BPM | SYSTOLIC BLOOD PRESSURE: 138 MMHG | WEIGHT: 147 LBS

## 2025-08-08 DIAGNOSIS — I73.9 PAD (PERIPHERAL ARTERY DISEASE): ICD-10-CM

## 2025-08-08 DIAGNOSIS — Z95.1 STATUS POST CORONARY ARTERY BYPASS GRAFT: ICD-10-CM

## 2025-08-08 DIAGNOSIS — R94.31 ABNORMAL ELECTROCARDIOGRAPHY: ICD-10-CM

## 2025-08-08 DIAGNOSIS — I45.10 RBBB (RIGHT BUNDLE BRANCH BLOCK): ICD-10-CM

## 2025-08-08 DIAGNOSIS — R00.1 SINUS BRADYCARDIA: ICD-10-CM

## 2025-08-08 DIAGNOSIS — I25.10 CORONARY ARTERY DISEASE INVOLVING NATIVE CORONARY ARTERY OF NATIVE HEART WITHOUT ANGINA PECTORIS: Primary | ICD-10-CM

## 2025-08-08 PROCEDURE — G8420 CALC BMI NORM PARAMETERS: HCPCS | Performed by: INTERNAL MEDICINE

## 2025-08-08 PROCEDURE — 1123F ACP DISCUSS/DSCN MKR DOCD: CPT | Performed by: INTERNAL MEDICINE

## 2025-08-08 PROCEDURE — G8427 DOCREV CUR MEDS BY ELIG CLIN: HCPCS | Performed by: INTERNAL MEDICINE

## 2025-08-08 PROCEDURE — 4004F PT TOBACCO SCREEN RCVD TLK: CPT | Performed by: INTERNAL MEDICINE

## 2025-08-08 PROCEDURE — 93000 ELECTROCARDIOGRAM COMPLETE: CPT | Performed by: INTERNAL MEDICINE

## 2025-08-08 PROCEDURE — 3075F SYST BP GE 130 - 139MM HG: CPT | Performed by: INTERNAL MEDICINE

## 2025-08-08 PROCEDURE — 3078F DIAST BP <80 MM HG: CPT | Performed by: INTERNAL MEDICINE

## 2025-08-08 PROCEDURE — 3017F COLORECTAL CA SCREEN DOC REV: CPT | Performed by: INTERNAL MEDICINE

## 2025-08-08 PROCEDURE — 99204 OFFICE O/P NEW MOD 45 MIN: CPT | Performed by: INTERNAL MEDICINE

## 2025-08-08 RX ORDER — REGADENOSON 0.08 MG/ML
0.4 INJECTION, SOLUTION INTRAVENOUS
OUTPATIENT
Start: 2025-08-08

## 2025-08-28 ENCOUNTER — OFFICE VISIT (OUTPATIENT)
Dept: SURGERY | Age: 70
End: 2025-08-28
Payer: COMMERCIAL

## 2025-08-28 VITALS
OXYGEN SATURATION: 99 % | TEMPERATURE: 98.4 F | BODY MASS INDEX: 20.41 KG/M2 | HEIGHT: 71 IN | SYSTOLIC BLOOD PRESSURE: 142 MMHG | WEIGHT: 145.8 LBS | RESPIRATION RATE: 18 BRPM | DIASTOLIC BLOOD PRESSURE: 58 MMHG | HEART RATE: 66 BPM

## 2025-08-28 DIAGNOSIS — K40.00 BILATERAL IRREDUCIBLE INGUINAL HERNIAS: Primary | ICD-10-CM

## 2025-08-28 PROBLEM — K40.20 BILATERAL INGUINAL HERNIA WITHOUT OBSTRUCTION OR GANGRENE: Status: ACTIVE | Noted: 2025-08-28

## 2025-08-28 PROCEDURE — 4004F PT TOBACCO SCREEN RCVD TLK: CPT | Performed by: SURGERY

## 2025-08-28 PROCEDURE — G8420 CALC BMI NORM PARAMETERS: HCPCS | Performed by: SURGERY

## 2025-08-28 PROCEDURE — 99204 OFFICE O/P NEW MOD 45 MIN: CPT | Performed by: SURGERY

## 2025-08-28 PROCEDURE — 1123F ACP DISCUSS/DSCN MKR DOCD: CPT | Performed by: SURGERY

## 2025-08-28 PROCEDURE — 3078F DIAST BP <80 MM HG: CPT | Performed by: SURGERY

## 2025-08-28 PROCEDURE — 3017F COLORECTAL CA SCREEN DOC REV: CPT | Performed by: SURGERY

## 2025-08-28 PROCEDURE — G8427 DOCREV CUR MEDS BY ELIG CLIN: HCPCS | Performed by: SURGERY

## 2025-08-28 PROCEDURE — 3077F SYST BP >= 140 MM HG: CPT | Performed by: SURGERY

## 2025-08-28 RX ORDER — SODIUM CHLORIDE 0.9 % (FLUSH) 0.9 %
5-40 SYRINGE (ML) INJECTION EVERY 12 HOURS SCHEDULED
OUTPATIENT
Start: 2025-08-28

## 2025-08-28 RX ORDER — SODIUM CHLORIDE 0.9 % (FLUSH) 0.9 %
5-40 SYRINGE (ML) INJECTION PRN
OUTPATIENT
Start: 2025-08-28

## 2025-08-28 RX ORDER — SODIUM CHLORIDE 9 MG/ML
INJECTION, SOLUTION INTRAVENOUS PRN
OUTPATIENT
Start: 2025-08-28

## 2025-08-29 ENCOUNTER — TELEPHONE (OUTPATIENT)
Dept: CARDIOLOGY | Age: 70
End: 2025-08-29

## 2025-09-03 ENCOUNTER — HOSPITAL ENCOUNTER (OUTPATIENT)
Dept: CARDIOLOGY | Age: 70
Discharge: HOME | End: 2025-09-05
Payer: COMMERCIAL

## 2025-09-03 VITALS
WEIGHT: 147 LBS | HEIGHT: 71 IN | SYSTOLIC BLOOD PRESSURE: 150 MMHG | RESPIRATION RATE: 18 BRPM | DIASTOLIC BLOOD PRESSURE: 50 MMHG | HEART RATE: 60 BPM | BODY MASS INDEX: 20.58 KG/M2

## 2025-09-03 DIAGNOSIS — I45.10 RBBB (RIGHT BUNDLE BRANCH BLOCK): ICD-10-CM

## 2025-09-03 DIAGNOSIS — Z95.1 STATUS POST CORONARY ARTERY BYPASS GRAFT: ICD-10-CM

## 2025-09-03 DIAGNOSIS — I25.10 CORONARY ARTERY DISEASE INVOLVING NATIVE CORONARY ARTERY OF NATIVE HEART WITHOUT ANGINA PECTORIS: ICD-10-CM

## 2025-09-03 DIAGNOSIS — R94.31 ABNORMAL ELECTROCARDIOGRAPHY: ICD-10-CM

## 2025-09-03 DIAGNOSIS — I73.9 PAD (PERIPHERAL ARTERY DISEASE): ICD-10-CM

## 2025-09-03 PROCEDURE — A9502 TC99M TETROFOSMIN: HCPCS | Performed by: INTERNAL MEDICINE

## 2025-09-03 PROCEDURE — 93017 CV STRESS TEST TRACING ONLY: CPT

## 2025-09-03 PROCEDURE — 2500000003 HC RX 250 WO HCPCS: Performed by: INTERNAL MEDICINE

## 2025-09-03 PROCEDURE — 6360000002 HC RX W HCPCS: Performed by: INTERNAL MEDICINE

## 2025-09-03 PROCEDURE — 3430000000 HC RX DIAGNOSTIC RADIOPHARMACEUTICAL: Performed by: INTERNAL MEDICINE

## 2025-09-03 RX ORDER — REGADENOSON 0.08 MG/ML
0.4 INJECTION, SOLUTION INTRAVENOUS
Status: COMPLETED | OUTPATIENT
Start: 2025-09-03 | End: 2025-09-03

## 2025-09-03 RX ORDER — SODIUM CHLORIDE 0.9 % (FLUSH) 0.9 %
10 SYRINGE (ML) INJECTION PRN
Status: DISCONTINUED | OUTPATIENT
Start: 2025-09-03 | End: 2025-09-06 | Stop reason: HOSPADM

## 2025-09-03 RX ADMIN — TETROFOSMIN 10 MILLICURIE: 0.23 INJECTION, POWDER, LYOPHILIZED, FOR SOLUTION INTRAVENOUS at 08:20

## 2025-09-03 RX ADMIN — REGADENOSON 0.4 MG: 0.08 INJECTION, SOLUTION INTRAVENOUS at 10:01

## 2025-09-03 RX ADMIN — SODIUM CHLORIDE, PRESERVATIVE FREE 10 ML: 5 INJECTION INTRAVENOUS at 10:01

## 2025-09-03 RX ADMIN — TETROFOSMIN 33.6 MILLICURIE: 0.23 INJECTION, POWDER, LYOPHILIZED, FOR SOLUTION INTRAVENOUS at 10:01

## 2025-09-03 RX ADMIN — SODIUM CHLORIDE, PRESERVATIVE FREE 10 ML: 5 INJECTION INTRAVENOUS at 08:21

## 2025-09-04 ENCOUNTER — TELEPHONE (OUTPATIENT)
Dept: CARDIOLOGY CLINIC | Age: 70
End: 2025-09-04

## 2025-09-04 LAB
ECHO BSA: 1.83 M2
NUC STRESS EJECTION FRACTION: 55 %
STRESS BASELINE DIAS BP: 71 MMHG
STRESS BASELINE HR: 59 BPM
STRESS BASELINE SYS BP: 174 MMHG
STRESS ESTIMATED WORKLOAD: 1 METS
STRESS PEAK DIAS BP: 71 MMHG
STRESS PEAK SYS BP: 174 MMHG
STRESS PERCENT HR ACHIEVED: 63 %
STRESS POST PEAK HR: 95 BPM
STRESS RATE PRESSURE PRODUCT: NORMAL BPM*MMHG
STRESS TARGET HR: 150 BPM
TID: 1.2

## 2025-09-04 PROCEDURE — 78452 HT MUSCLE IMAGE SPECT MULT: CPT | Performed by: INTERNAL MEDICINE

## 2025-09-04 PROCEDURE — 93016 CV STRESS TEST SUPVJ ONLY: CPT | Performed by: INTERNAL MEDICINE

## 2025-09-04 PROCEDURE — 93018 CV STRESS TEST I&R ONLY: CPT | Performed by: INTERNAL MEDICINE
